# Patient Record
Sex: MALE | Race: WHITE | Employment: OTHER | URBAN - METROPOLITAN AREA
[De-identification: names, ages, dates, MRNs, and addresses within clinical notes are randomized per-mention and may not be internally consistent; named-entity substitution may affect disease eponyms.]

---

## 2017-04-28 PROCEDURE — 82043 UR ALBUMIN QUANTITATIVE: CPT | Performed by: INTERNAL MEDICINE

## 2017-04-28 PROCEDURE — 82570 ASSAY OF URINE CREATININE: CPT | Performed by: INTERNAL MEDICINE

## 2017-06-26 PROBLEM — E78.00 PURE HYPERCHOLESTEROLEMIA: Status: ACTIVE | Noted: 2017-06-26

## 2017-06-26 PROBLEM — I10 ESSENTIAL HYPERTENSION: Status: ACTIVE | Noted: 2017-06-26

## 2019-03-22 NOTE — LETTER
Patient Name: Micaela Guevara / Sex: 11/16/1960-A: 57 y  male   Medical Records: [id number] CSN: [id number]    DNAR NOTIFICATION    Your patient listed above has a procedure scheduled at [facility] and has indicated that he/she currently has a DNAR (Do Not Attempt Resuscitattion) with their Advanced Directives. Please note that you will be asked to address this matter with your patient pre-operatively. Thank you.       Procedure Date 8/14/2023  Procedure ESOPHAGOGASTRODUODENOSCOPY (EGD), N/A    Surgeon(s):  Jeremy Mensah DO Statement Selected

## 2023-02-06 ENCOUNTER — HOSPITAL ENCOUNTER (INPATIENT)
Facility: HOSPITAL | Age: 63
LOS: 3 days | Discharge: HOME | End: 2023-02-09
Attending: EMERGENCY MEDICINE | Admitting: INTERNAL MEDICINE
Payer: COMMERCIAL

## 2023-02-06 ENCOUNTER — APPOINTMENT (OUTPATIENT)
Dept: CT IMAGING | Facility: HOSPITAL | Age: 63
End: 2023-02-06
Attending: EMERGENCY MEDICINE
Payer: COMMERCIAL

## 2023-02-06 ENCOUNTER — APPOINTMENT (OUTPATIENT)
Dept: MRI IMAGING | Facility: HOSPITAL | Age: 63
End: 2023-02-06
Attending: EMERGENCY MEDICINE
Payer: COMMERCIAL

## 2023-02-06 DIAGNOSIS — E11.65 TYPE 2 DIABETES MELLITUS WITH HYPERGLYCEMIA, WITHOUT LONG-TERM CURRENT USE OF INSULIN (HCC): ICD-10-CM

## 2023-02-06 DIAGNOSIS — I63.9 ACUTE ISCHEMIC STROKE (HCC): Primary | ICD-10-CM

## 2023-02-06 PROBLEM — I10 BENIGN ESSENTIAL HTN: Status: ACTIVE | Noted: 2017-06-26

## 2023-02-06 LAB
ALBUMIN SERPL-MCNC: 3.9 G/DL (ref 3.4–5)
ALBUMIN/GLOB SERPL: 1 {RATIO} (ref 1–2)
ALP LIVER SERPL-CCNC: 162 U/L
ALT SERPL-CCNC: 53 U/L
ANION GAP SERPL CALC-SCNC: 6 MMOL/L (ref 0–18)
AST SERPL-CCNC: 36 U/L (ref 15–37)
BASOPHILS # BLD AUTO: 0.07 X10(3) UL (ref 0–0.2)
BASOPHILS NFR BLD AUTO: 1 %
BILIRUB SERPL-MCNC: 0.7 MG/DL (ref 0.1–2)
BUN BLD-MCNC: 17 MG/DL (ref 7–18)
CALCIUM BLD-MCNC: 9.6 MG/DL (ref 8.5–10.1)
CHLORIDE SERPL-SCNC: 102 MMOL/L (ref 98–112)
CHOLEST SERPL-MCNC: 210 MG/DL (ref ?–200)
CO2 SERPL-SCNC: 24 MMOL/L (ref 21–32)
CREAT BLD-MCNC: 1.22 MG/DL
EOSINOPHIL # BLD AUTO: 0.16 X10(3) UL (ref 0–0.7)
EOSINOPHIL NFR BLD AUTO: 2.4 %
ERYTHROCYTE [DISTWIDTH] IN BLOOD BY AUTOMATED COUNT: 12.6 %
EST. AVERAGE GLUCOSE BLD GHB EST-MCNC: 269 MG/DL (ref 68–126)
GFR SERPLBLD BASED ON 1.73 SQ M-ARVRAT: 66 ML/MIN/1.73M2 (ref 60–?)
GLOBULIN PLAS-MCNC: 4 G/DL (ref 2.8–4.4)
GLUCOSE BLD-MCNC: 242 MG/DL (ref 70–99)
GLUCOSE BLD-MCNC: 297 MG/DL (ref 70–99)
GLUCOSE BLD-MCNC: 356 MG/DL (ref 70–99)
GLUCOSE BLD-MCNC: 363 MG/DL (ref 70–99)
GLUCOSE BLD-MCNC: 413 MG/DL (ref 70–99)
HBA1C MFR BLD: 11 % (ref ?–5.7)
HCT VFR BLD AUTO: 53.9 %
HDLC SERPL-MCNC: 41 MG/DL (ref 40–59)
HGB BLD-MCNC: 18.2 G/DL
IMM GRANULOCYTES # BLD AUTO: 0.03 X10(3) UL (ref 0–1)
IMM GRANULOCYTES NFR BLD: 0.4 %
LDLC SERPL CALC-MCNC: 118 MG/DL (ref ?–100)
LYMPHOCYTES # BLD AUTO: 1.79 X10(3) UL (ref 1–4)
LYMPHOCYTES NFR BLD AUTO: 26.4 %
MCH RBC QN AUTO: 30.3 PG (ref 26–34)
MCHC RBC AUTO-ENTMCNC: 33.8 G/DL (ref 31–37)
MCV RBC AUTO: 89.8 FL
MONOCYTES # BLD AUTO: 0.34 X10(3) UL (ref 0.1–1)
MONOCYTES NFR BLD AUTO: 5 %
NEUTROPHILS # BLD AUTO: 4.39 X10 (3) UL (ref 1.5–7.7)
NEUTROPHILS # BLD AUTO: 4.39 X10(3) UL (ref 1.5–7.7)
NEUTROPHILS NFR BLD AUTO: 64.8 %
NONHDLC SERPL-MCNC: 169 MG/DL (ref ?–130)
OSMOLALITY SERPL CALC.SUM OF ELEC: 293 MOSM/KG (ref 275–295)
PLATELET # BLD AUTO: 202 10(3)UL (ref 150–450)
POTASSIUM SERPL-SCNC: 3.6 MMOL/L (ref 3.5–5.1)
PROT SERPL-MCNC: 7.9 G/DL (ref 6.4–8.2)
RBC # BLD AUTO: 6 X10(6)UL
SARS-COV-2 RNA RESP QL NAA+PROBE: NOT DETECTED
SODIUM SERPL-SCNC: 132 MMOL/L (ref 136–145)
TRIGL SERPL-MCNC: 292 MG/DL (ref 30–149)
TROPONIN I HIGH SENSITIVITY: 15 NG/L
VLDLC SERPL CALC-MCNC: 52 MG/DL (ref 0–30)
WBC # BLD AUTO: 6.8 X10(3) UL (ref 4–11)

## 2023-02-06 PROCEDURE — 70546 MR ANGIOGRAPH HEAD W/O&W/DYE: CPT | Performed by: EMERGENCY MEDICINE

## 2023-02-06 PROCEDURE — 70450 CT HEAD/BRAIN W/O DYE: CPT | Performed by: EMERGENCY MEDICINE

## 2023-02-06 PROCEDURE — 70553 MRI BRAIN STEM W/O & W/DYE: CPT | Performed by: EMERGENCY MEDICINE

## 2023-02-06 PROCEDURE — 70549 MR ANGIOGRAPH NECK W/O&W/DYE: CPT | Performed by: EMERGENCY MEDICINE

## 2023-02-06 PROCEDURE — 99223 1ST HOSP IP/OBS HIGH 75: CPT | Performed by: HOSPITALIST

## 2023-02-06 RX ORDER — PROCHLORPERAZINE EDISYLATE 5 MG/ML
5 INJECTION INTRAMUSCULAR; INTRAVENOUS EVERY 8 HOURS PRN
Status: DISCONTINUED | OUTPATIENT
Start: 2023-02-06 | End: 2023-02-09

## 2023-02-06 RX ORDER — GADOTERATE MEGLUMINE 376.9 MG/ML
20 INJECTION INTRAVENOUS
Status: COMPLETED | OUTPATIENT
Start: 2023-02-06 | End: 2023-02-06

## 2023-02-06 RX ORDER — SODIUM CHLORIDE 9 MG/ML
INJECTION, SOLUTION INTRAVENOUS CONTINUOUS
Status: ACTIVE | OUTPATIENT
Start: 2023-02-06 | End: 2023-02-08

## 2023-02-06 RX ORDER — ONDANSETRON 2 MG/ML
4 INJECTION INTRAMUSCULAR; INTRAVENOUS EVERY 6 HOURS PRN
Status: DISCONTINUED | OUTPATIENT
Start: 2023-02-06 | End: 2023-02-09

## 2023-02-06 RX ORDER — DEXTROSE MONOHYDRATE 25 G/50ML
50 INJECTION, SOLUTION INTRAVENOUS
Status: DISCONTINUED | OUTPATIENT
Start: 2023-02-06 | End: 2023-02-09

## 2023-02-06 RX ORDER — INSULIN ASPART 100 [IU]/ML
5 INJECTION, SOLUTION INTRAVENOUS; SUBCUTANEOUS ONCE
Status: COMPLETED | OUTPATIENT
Start: 2023-02-06 | End: 2023-02-06

## 2023-02-06 RX ORDER — ASPIRIN 81 MG/1
324 TABLET, CHEWABLE ORAL ONCE
Status: DISCONTINUED | OUTPATIENT
Start: 2023-02-06 | End: 2023-02-06

## 2023-02-06 RX ORDER — HYDRALAZINE HYDROCHLORIDE 20 MG/ML
10 INJECTION INTRAMUSCULAR; INTRAVENOUS EVERY 2 HOUR PRN
Status: DISCONTINUED | OUTPATIENT
Start: 2023-02-06 | End: 2023-02-09

## 2023-02-06 RX ORDER — ACETAMINOPHEN 325 MG/1
650 TABLET ORAL EVERY 4 HOURS PRN
Status: DISCONTINUED | OUTPATIENT
Start: 2023-02-06 | End: 2023-02-09

## 2023-02-06 RX ORDER — LABETALOL HYDROCHLORIDE 5 MG/ML
10 INJECTION, SOLUTION INTRAVENOUS ONCE
Status: COMPLETED | OUTPATIENT
Start: 2023-02-06 | End: 2023-02-06

## 2023-02-06 RX ORDER — ASPIRIN 300 MG/1
300 SUPPOSITORY RECTAL ONCE
Status: COMPLETED | OUTPATIENT
Start: 2023-02-06 | End: 2023-02-06

## 2023-02-06 RX ORDER — ASPIRIN 325 MG
325 TABLET ORAL DAILY
Status: DISCONTINUED | OUTPATIENT
Start: 2023-02-07 | End: 2023-02-09

## 2023-02-06 RX ORDER — HYDROCODONE BITARTRATE AND ACETAMINOPHEN 5; 325 MG/1; MG/1
1 TABLET ORAL EVERY 6 HOURS PRN
Status: DISCONTINUED | OUTPATIENT
Start: 2023-02-06 | End: 2023-02-09

## 2023-02-06 RX ORDER — ENOXAPARIN SODIUM 100 MG/ML
40 INJECTION SUBCUTANEOUS DAILY
Status: DISCONTINUED | OUTPATIENT
Start: 2023-02-06 | End: 2023-02-09

## 2023-02-06 RX ORDER — LABETALOL HYDROCHLORIDE 5 MG/ML
10 INJECTION, SOLUTION INTRAVENOUS EVERY 10 MIN PRN
Status: DISCONTINUED | OUTPATIENT
Start: 2023-02-06 | End: 2023-02-09

## 2023-02-06 RX ORDER — NICOTINE POLACRILEX 4 MG
15 LOZENGE BUCCAL
Status: DISCONTINUED | OUTPATIENT
Start: 2023-02-06 | End: 2023-02-09

## 2023-02-06 RX ORDER — ACETAMINOPHEN 500 MG
500 TABLET ORAL EVERY 4 HOURS PRN
Status: DISCONTINUED | OUTPATIENT
Start: 2023-02-06 | End: 2023-02-09

## 2023-02-06 RX ORDER — ASPIRIN 300 MG/1
300 SUPPOSITORY RECTAL DAILY
Status: DISCONTINUED | OUTPATIENT
Start: 2023-02-07 | End: 2023-02-09

## 2023-02-06 RX ORDER — ACETAMINOPHEN 650 MG/1
650 SUPPOSITORY RECTAL EVERY 4 HOURS PRN
Status: DISCONTINUED | OUTPATIENT
Start: 2023-02-06 | End: 2023-02-09

## 2023-02-06 RX ORDER — ATORVASTATIN CALCIUM 40 MG/1
40 TABLET, FILM COATED ORAL NIGHTLY
Status: DISCONTINUED | OUTPATIENT
Start: 2023-02-06 | End: 2023-02-07

## 2023-02-06 RX ORDER — NICOTINE POLACRILEX 4 MG
30 LOZENGE BUCCAL
Status: DISCONTINUED | OUTPATIENT
Start: 2023-02-06 | End: 2023-02-09

## 2023-02-06 NOTE — ED QUICK NOTES
Orders for admission, patient is aware of plan and ready to go upstairs. Any questions, please call ED RN Estella Lowery at extension [phone].      Patient Covid vaccination status: Unvaccinated     COVID Test Ordered in ED: Rapid SARS-CoV-2 by PCR    COVID Suspicion at Admission: Low clinical suspicion for COVID    Running Infusions:      Mental Status/LOC at time of transport: A&Ox4    Other pertinent information:   CIWA score: N/A   NIH score:  0

## 2023-02-06 NOTE — ED INITIAL ASSESSMENT (HPI)
Pt daughter noticed her father's speech was slurring this morning beginning about 0930 this am. Pt states he has left leg pain. Pt is a diabetic but does not comply with medications.

## 2023-02-06 NOTE — ED QUICK NOTES
Spoke with pt regarding his admission, his hyperglycemia, hypertension, and his upcoming MRI. The pt understands for the most part, pt wife verbalizes her understanding.

## 2023-02-07 ENCOUNTER — APPOINTMENT (OUTPATIENT)
Dept: CV DIAGNOSTICS | Facility: HOSPITAL | Age: 63
End: 2023-02-07
Attending: HOSPITALIST
Payer: COMMERCIAL

## 2023-02-07 LAB
ATRIAL RATE: 126 BPM
GLUCOSE BLD-MCNC: 184 MG/DL (ref 70–99)
GLUCOSE BLD-MCNC: 190 MG/DL (ref 70–99)
GLUCOSE BLD-MCNC: 222 MG/DL (ref 70–99)
GLUCOSE BLD-MCNC: 259 MG/DL (ref 70–99)
P AXIS: 41 DEGREES
P-R INTERVAL: 140 MS
POTASSIUM SERPL-SCNC: 3.9 MMOL/L (ref 3.5–5.1)
Q-T INTERVAL: 338 MS
QRS DURATION: 110 MS
QTC CALCULATION (BEZET): 489 MS
R AXIS: -58 DEGREES
T AXIS: 18 DEGREES
VENTRICULAR RATE: 126 BPM

## 2023-02-07 PROCEDURE — 93306 TTE W/DOPPLER COMPLETE: CPT | Performed by: HOSPITALIST

## 2023-02-07 PROCEDURE — 99221 1ST HOSP IP/OBS SF/LOW 40: CPT | Performed by: INTERNAL MEDICINE

## 2023-02-07 PROCEDURE — 99232 SBSQ HOSP IP/OBS MODERATE 35: CPT | Performed by: HOSPITALIST

## 2023-02-07 RX ORDER — ASPIRIN 325 MG
325 TABLET ORAL DAILY
Qty: 30 TABLET | Refills: 2 | Status: ON HOLD | OUTPATIENT
Start: 2023-02-08 | End: 2023-02-16

## 2023-02-07 RX ORDER — POTASSIUM CHLORIDE 20 MEQ/1
40 TABLET, EXTENDED RELEASE ORAL EVERY 4 HOURS
Status: COMPLETED | OUTPATIENT
Start: 2023-02-07 | End: 2023-02-07

## 2023-02-07 RX ORDER — AMLODIPINE BESYLATE 5 MG/1
5 TABLET ORAL DAILY
Status: DISCONTINUED | OUTPATIENT
Start: 2023-02-07 | End: 2023-02-08

## 2023-02-07 RX ORDER — LOSARTAN POTASSIUM 25 MG/1
25 TABLET ORAL DAILY
Status: DISCONTINUED | OUTPATIENT
Start: 2023-02-07 | End: 2023-02-08

## 2023-02-07 RX ORDER — ATORVASTATIN CALCIUM 80 MG/1
80 TABLET, FILM COATED ORAL NIGHTLY
Qty: 30 TABLET | Refills: 2 | Status: ON HOLD | OUTPATIENT
Start: 2023-02-07

## 2023-02-07 RX ORDER — ATORVASTATIN CALCIUM 80 MG/1
80 TABLET, FILM COATED ORAL NIGHTLY
Status: DISCONTINUED | OUTPATIENT
Start: 2023-02-07 | End: 2023-02-09

## 2023-02-07 NOTE — DISCHARGE INSTRUCTIONS
[facility]  For more information on services offered at our [facility] location, please call [phone]     Psychiatry- Dr. Rosalina Adamson would like you to have referrals for you to follow up with a psychotherapist and psychiatrist:    Psychiatrist/ Nurse Practitioner    [address] both psychiatrist and psychotherapist  [phone]  [address] [place], [address] [facility] [place] [facility]  [address]   Psychiatric Mental Health Nurse Practitioner ([address])   [place], [place], [address]   [phone] Extension: [address] [facility]   Nurse Practitioner ([address])   [address] [facility], [address], [facility], [address]   [phone] Extension:        Burlene Spring   Nurse Practitioner [facility] in Psych and Counseling)   [address] [facility] [address] [facility], [facility], [address]   [phone] Extension: [phone]      For Psychotherapy:       Sarah Osuna   Counselor [facility] in [place] and Counseling)   [facility] [facility], [place], [place], [address]  [phone]     Sydney Willis   Therapist (Counseling Works)   [address] [facility], [place], [place], [address]   [phone] Extension: [address]   Therapist [facility] Counselors)   [address], [place], [place], [address]                                      [phone] Extension: [phone]       Olman Jean   Therapist [facility])   [address] [facility], [place], [address]   [phone] Extension: [phone]

## 2023-02-07 NOTE — CM/SW NOTE
PT rec is Day Rehab. Referral sent to [facility]. MSW met with pt and his family at bedside, they would like referral to Day rehab.

## 2023-02-07 NOTE — PLAN OF CARE
PT A/O, 94% ON RA, BP WITHIN PARAMETERS, NEUROS WERE DONE Q 2 HOURS, NOW Q 4 HOURS, NO NEURO DEFICITS, IVF INFUSING, SLEEPING QUIETLY MOST OF NIGHT, INSTRUCTED PT ON POC, ECHO IN AM    Problem: NEUROLOGICAL - ADULT  Goal: Achieves stable or improved neurological status  Description: INTERVENTIONS  - Assess for and report changes in neurological status  - Initiate measures to prevent increased intracranial pressure  - Maintain blood pressure and fluid volume within ordered parameters to optimize cerebral perfusion and minimize risk of hemorrhage  - Monitor temperature, glucose, and sodium.  Initiate appropriate interventions as ordered  Outcome: Progressing

## 2023-02-07 NOTE — PROGRESS NOTES
MRI result called in to Neurologist Dr. Wendy Lacey. Okay to keep SBP<220 .  Further neuro orders will be placed by Dr. Reba Casillas

## 2023-02-08 ENCOUNTER — TELEPHONE (OUTPATIENT)
Dept: CASE MANAGEMENT | Facility: HOSPITAL | Age: 63
End: 2023-02-08

## 2023-02-08 LAB
BASOPHILS # BLD AUTO: 0.07 X10(3) UL (ref 0–0.2)
BASOPHILS NFR BLD AUTO: 0.8 %
EOSINOPHIL # BLD AUTO: 0.47 X10(3) UL (ref 0–0.7)
EOSINOPHIL NFR BLD AUTO: 5.2 %
ERYTHROCYTE [DISTWIDTH] IN BLOOD BY AUTOMATED COUNT: 12.4 %
GLUCOSE BLD-MCNC: 179 MG/DL (ref 70–99)
GLUCOSE BLD-MCNC: 195 MG/DL (ref 70–99)
GLUCOSE BLD-MCNC: 198 MG/DL (ref 70–99)
GLUCOSE BLD-MCNC: 254 MG/DL (ref 70–99)
HCT VFR BLD AUTO: 51.1 %
HGB BLD-MCNC: 16.5 G/DL
IMM GRANULOCYTES # BLD AUTO: 0.05 X10(3) UL (ref 0–1)
IMM GRANULOCYTES NFR BLD: 0.6 %
LYMPHOCYTES # BLD AUTO: 2.12 X10(3) UL (ref 1–4)
LYMPHOCYTES NFR BLD AUTO: 23.6 %
MCH RBC QN AUTO: 30.2 PG (ref 26–34)
MCHC RBC AUTO-ENTMCNC: 32.3 G/DL (ref 31–37)
MCV RBC AUTO: 93.4 FL
MONOCYTES # BLD AUTO: 0.71 X10(3) UL (ref 0.1–1)
MONOCYTES NFR BLD AUTO: 7.9 %
NEUTROPHILS # BLD AUTO: 5.56 X10 (3) UL (ref 1.5–7.7)
NEUTROPHILS # BLD AUTO: 5.56 X10(3) UL (ref 1.5–7.7)
NEUTROPHILS NFR BLD AUTO: 61.9 %
PLATELET # BLD AUTO: 170 10(3)UL (ref 150–450)
RBC # BLD AUTO: 5.47 X10(6)UL
WBC # BLD AUTO: 9 X10(3) UL (ref 4–11)

## 2023-02-08 PROCEDURE — 99232 SBSQ HOSP IP/OBS MODERATE 35: CPT | Performed by: HOSPITALIST

## 2023-02-08 RX ORDER — ESCITALOPRAM OXALATE 5 MG/1
5 TABLET ORAL DAILY
Status: DISCONTINUED | OUTPATIENT
Start: 2023-02-08 | End: 2023-02-09

## 2023-02-08 RX ORDER — LOSARTAN POTASSIUM 100 MG/1
100 TABLET ORAL DAILY
Status: DISCONTINUED | OUTPATIENT
Start: 2023-02-09 | End: 2023-02-09

## 2023-02-08 RX ORDER — INSULIN GLARGINE-YFGN 100 [IU]/ML
9 INJECTION, SOLUTION SUBCUTANEOUS NIGHTLY
Qty: 15 ML | Refills: 3 | Status: SHIPPED | OUTPATIENT
Start: 2023-02-08 | End: 2023-02-09

## 2023-02-08 RX ORDER — LOSARTAN POTASSIUM 25 MG/1
25 TABLET ORAL ONCE
Status: COMPLETED | OUTPATIENT
Start: 2023-02-08 | End: 2023-02-08

## 2023-02-08 RX ORDER — AMLODIPINE BESYLATE 10 MG/1
10 TABLET ORAL DAILY
Qty: 30 TABLET | Refills: 0 | Status: ON HOLD | OUTPATIENT
Start: 2023-02-09

## 2023-02-08 RX ORDER — ESCITALOPRAM OXALATE 5 MG/1
5 TABLET ORAL DAILY
Qty: 30 TABLET | Refills: 0 | Status: ON HOLD | OUTPATIENT
Start: 2023-02-08

## 2023-02-08 RX ORDER — PEN NEEDLE, DIABETIC 32GX 5/32"
NEEDLE, DISPOSABLE MISCELLANEOUS
Qty: 100 EACH | Refills: 6 | Status: SHIPPED | OUTPATIENT
Start: 2023-02-08 | End: 2023-02-10

## 2023-02-08 RX ORDER — BLOOD SUGAR DIAGNOSTIC
STRIP MISCELLANEOUS
Qty: 50 STRIP | Refills: 6 | Status: SHIPPED | OUTPATIENT
Start: 2023-02-08 | End: 2023-02-10

## 2023-02-08 RX ORDER — AMLODIPINE BESYLATE 5 MG/1
10 TABLET ORAL DAILY
Status: DISCONTINUED | OUTPATIENT
Start: 2023-02-09 | End: 2023-02-09

## 2023-02-08 RX ORDER — AMLODIPINE BESYLATE 5 MG/1
5 TABLET ORAL ONCE
Status: COMPLETED | OUTPATIENT
Start: 2023-02-08 | End: 2023-02-08

## 2023-02-08 RX ORDER — BLOOD-GLUCOSE METER
EACH MISCELLANEOUS
Qty: 1 KIT | Refills: 0 | Status: SHIPPED | OUTPATIENT
Start: 2023-02-08 | End: 2023-02-10

## 2023-02-08 RX ORDER — INSULIN LISPRO 200 [IU]/ML
INJECTION, SOLUTION SUBCUTANEOUS
Qty: 5 EACH | Refills: 3 | Status: ON HOLD | OUTPATIENT
Start: 2023-02-08

## 2023-02-08 RX ORDER — LOSARTAN POTASSIUM 50 MG/1
50 TABLET ORAL DAILY
Qty: 30 TABLET | Refills: 0 | Status: SHIPPED | OUTPATIENT
Start: 2023-02-09 | End: 2023-02-09

## 2023-02-08 RX ORDER — LOSARTAN POTASSIUM 50 MG/1
50 TABLET ORAL DAILY
Status: DISCONTINUED | OUTPATIENT
Start: 2023-02-09 | End: 2023-02-08

## 2023-02-08 NOTE — PLAN OF CARE
Assumed patient care at [address] [place] [address]. Patient is A&Ox4. Neuro checks Q4H. NIH Daily. Neurology-following. Vital signs stable. Afebrile. Room air. SpO2 above 94%. NSR on tele. Hypertensive, PRN Hydralazine if SBP >180. Carb Controlled diet. Poor appetite. Accu checks QID, insulin given per MAR. Continent. Up independently. Right FA IV infusing 0.9%NS at 75 ml/hr. See MAR/Flowsheets for further information. All needs being met at this time. Prblem: Diabetes/Glucose Control  Goal: Glucose maintained within prescribed range  Description: INTERVENTIONS:  - Monitor Blood Glucose as ordered  - Assess for signs and symptoms of hyperglycemia and hypoglycemia  - Administer ordered medications to maintain glucose within target range  - Assess barriers to adequate nutritional intake and initiate nutrition consult as needed  - Instruct patient on self management of diabetes  Outcome: Progressing     Problem: Patient/Family Goals  Goal: Patient/Family Long Term Goal  Description: Patient's Long Term Goal: Discharge    Interventions:  - MD Consults  - Stroke protocol  - IV Fluids  - Give medications as ordered  - See additional Care Plan goals for specific interventions  Outcome: Progressing  Goal: Patient/Family Short Term Goal  Description: Patient's Short Term Goal: Continue stroke protocol. Interventions:   - Neuro checks and NIH   - Tele monitor/  - Accuchecks   - IV fluids  - PT/OT  - See additional Care Plan goals for specific interventions  Outcome: Progressing     Problem: NEUROLOGICAL - ADULT  Goal: Achieves stable or improved neurological status  Description: INTERVENTIONS  - Assess for and report changes in neurological status  - Initiate measures to prevent increased intracranial pressure  - Maintain blood pressure and fluid volume within ordered parameters to optimize cerebral perfusion and minimize risk of hemorrhage  - Monitor temperature, glucose, and sodium.  Initiate appropriate interventions as ordered  Outcome: Progressing

## 2023-02-08 NOTE — PROGRESS NOTES
Neuro checks Q4H. Patient able to ambulate without assistance. Accuchecks, blood sugars elevated. Patient has poor appetite. Diabetes education reviewed including importance of dietary choices and exercise. IV fluids infusing. BP elevated. Prn if >180. Wife at bedside. Continue to monitor.

## 2023-02-08 NOTE — CM/SW NOTE
Yaya RN case manager with [employer] calling to offer discharge planning assisatcne. If needed please call back at [phone].

## 2023-02-08 NOTE — BH PROGRESS NOTE
Referrals placed in the discharge summary for the patient to follow up with a psychotherapist and psychiatrist per Dr. Shlomo Clark.

## 2023-02-09 VITALS
HEIGHT: 67 IN | SYSTOLIC BLOOD PRESSURE: 130 MMHG | HEART RATE: 95 BPM | DIASTOLIC BLOOD PRESSURE: 74 MMHG | TEMPERATURE: 98 F | WEIGHT: 199 LBS | OXYGEN SATURATION: 96 % | RESPIRATION RATE: 18 BRPM | BODY MASS INDEX: 31.23 KG/M2

## 2023-02-09 LAB
CREAT BLD-MCNC: 0.83 MG/DL
GFR SERPLBLD BASED ON 1.73 SQ M-ARVRAT: 98 ML/MIN/1.73M2 (ref 60–?)
GLUCOSE BLD-MCNC: 156 MG/DL (ref 70–99)
GLUCOSE BLD-MCNC: 180 MG/DL (ref 70–99)

## 2023-02-09 PROCEDURE — 99239 HOSP IP/OBS DSCHRG MGMT >30: CPT | Performed by: HOSPITALIST

## 2023-02-09 RX ORDER — LOSARTAN POTASSIUM 100 MG/1
100 TABLET ORAL DAILY
Qty: 30 TABLET | Refills: 0 | Status: ON HOLD | OUTPATIENT
Start: 2023-02-10

## 2023-02-09 RX ORDER — INSULIN GLARGINE-YFGN 100 [IU]/ML
10 INJECTION, SOLUTION SUBCUTANEOUS NIGHTLY
Qty: 30 ML | Refills: 3 | Status: ON HOLD | OUTPATIENT
Start: 2023-02-09

## 2023-02-09 RX ORDER — METOPROLOL TARTRATE 50 MG/1
50 TABLET, FILM COATED ORAL
Qty: 60 TABLET | Refills: 0 | Status: ON HOLD | OUTPATIENT
Start: 2023-02-09

## 2023-02-09 NOTE — PROGRESS NOTES
Pt's /102 one dose labetetol was given, second check 162/197. Patient's BP was not maintained under 160, so he was not discharged tonight. Education and demonstration provided to patient, his wife, and his son regarding administering insulin. Reviewed materials with family.

## 2023-02-09 NOTE — PROGRESS NOTES
Pt A&Ox4. Impulsive. Qshift neuro checks. L sided deficit. Per order, to keep SBP <180. On tele running NSR.  RA.  0.9% NaCl @ 75. Antidepressant started. Carb controlled diet. Accuchecks. Unsteady. Uses walker. Plan to have day rehab outpatient.

## 2023-02-10 ENCOUNTER — APPOINTMENT (OUTPATIENT)
Dept: GENERAL RADIOLOGY | Facility: HOSPITAL | Age: 63
DRG: 064 | End: 2023-02-10
Attending: HOSPITALIST
Payer: COMMERCIAL

## 2023-02-10 ENCOUNTER — PATIENT OUTREACH (OUTPATIENT)
Dept: CASE MANAGEMENT | Age: 63
End: 2023-02-10

## 2023-02-10 ENCOUNTER — HOSPITAL ENCOUNTER (INPATIENT)
Facility: HOSPITAL | Age: 63
LOS: 11 days | Discharge: NURSING, REHAB OR LONG-TERM CARE | End: 2023-02-24
Attending: EMERGENCY MEDICINE | Admitting: HOSPITALIST
Payer: COMMERCIAL

## 2023-02-10 ENCOUNTER — APPOINTMENT (OUTPATIENT)
Dept: CT IMAGING | Facility: HOSPITAL | Age: 63
DRG: 064 | End: 2023-02-10
Attending: EMERGENCY MEDICINE
Payer: COMMERCIAL

## 2023-02-10 ENCOUNTER — APPOINTMENT (OUTPATIENT)
Dept: ULTRASOUND IMAGING | Facility: HOSPITAL | Age: 63
End: 2023-02-10
Attending: EMERGENCY MEDICINE
Payer: COMMERCIAL

## 2023-02-10 ENCOUNTER — APPOINTMENT (OUTPATIENT)
Dept: GENERAL RADIOLOGY | Facility: HOSPITAL | Age: 63
End: 2023-02-10
Attending: EMERGENCY MEDICINE
Payer: COMMERCIAL

## 2023-02-10 ENCOUNTER — APPOINTMENT (OUTPATIENT)
Dept: GENERAL RADIOLOGY | Facility: HOSPITAL | Age: 63
DRG: 064 | End: 2023-02-10
Attending: EMERGENCY MEDICINE
Payer: COMMERCIAL

## 2023-02-10 ENCOUNTER — HOSPITAL ENCOUNTER (INPATIENT)
Facility: HOSPITAL | Age: 63
LOS: 11 days | Discharge: NURSING, REHAB OR LONG-TERM CARE | DRG: 064 | End: 2023-02-24
Attending: EMERGENCY MEDICINE | Admitting: HOSPITALIST
Payer: COMMERCIAL

## 2023-02-10 ENCOUNTER — APPOINTMENT (OUTPATIENT)
Dept: ULTRASOUND IMAGING | Facility: HOSPITAL | Age: 63
DRG: 064 | End: 2023-02-10
Attending: EMERGENCY MEDICINE
Payer: COMMERCIAL

## 2023-02-10 ENCOUNTER — APPOINTMENT (OUTPATIENT)
Dept: MRI IMAGING | Facility: HOSPITAL | Age: 63
DRG: 064 | End: 2023-02-10
Attending: Other
Payer: COMMERCIAL

## 2023-02-10 ENCOUNTER — APPOINTMENT (OUTPATIENT)
Dept: CT IMAGING | Facility: HOSPITAL | Age: 63
End: 2023-02-10
Attending: EMERGENCY MEDICINE
Payer: COMMERCIAL

## 2023-02-10 ENCOUNTER — APPOINTMENT (OUTPATIENT)
Dept: GENERAL RADIOLOGY | Facility: HOSPITAL | Age: 63
End: 2023-02-10
Attending: HOSPITALIST
Payer: COMMERCIAL

## 2023-02-10 ENCOUNTER — APPOINTMENT (OUTPATIENT)
Dept: MRI IMAGING | Facility: HOSPITAL | Age: 63
End: 2023-02-10
Attending: Other
Payer: COMMERCIAL

## 2023-02-10 DIAGNOSIS — I10 UNCONTROLLED HYPERTENSION: Primary | ICD-10-CM

## 2023-02-10 DIAGNOSIS — R53.1 WEAKNESS GENERALIZED: ICD-10-CM

## 2023-02-10 LAB
ALBUMIN SERPL-MCNC: 3.6 G/DL (ref 3.4–5)
ALBUMIN/GLOB SERPL: 1 {RATIO} (ref 1–2)
ALP LIVER SERPL-CCNC: 107 U/L
ALT SERPL-CCNC: 41 U/L
ANION GAP SERPL CALC-SCNC: 7 MMOL/L (ref 0–18)
AST SERPL-CCNC: 40 U/L (ref 15–37)
BASOPHILS # BLD AUTO: 0.06 X10(3) UL (ref 0–0.2)
BASOPHILS NFR BLD AUTO: 0.4 %
BILIRUB SERPL-MCNC: 1.3 MG/DL (ref 0.1–2)
BILIRUB UR QL STRIP.AUTO: NEGATIVE
BUN BLD-MCNC: 19 MG/DL (ref 7–18)
CALCIUM BLD-MCNC: 9.4 MG/DL (ref 8.5–10.1)
CHLORIDE SERPL-SCNC: 104 MMOL/L (ref 98–112)
CO2 SERPL-SCNC: 26 MMOL/L (ref 21–32)
COLOR UR AUTO: YELLOW
CREAT BLD-MCNC: 1.05 MG/DL
EOSINOPHIL # BLD AUTO: 0.35 X10(3) UL (ref 0–0.7)
EOSINOPHIL NFR BLD AUTO: 2.5 %
ERYTHROCYTE [DISTWIDTH] IN BLOOD BY AUTOMATED COUNT: 12.2 %
GFR SERPLBLD BASED ON 1.73 SQ M-ARVRAT: 79 ML/MIN/1.73M2 (ref 60–?)
GLOBULIN PLAS-MCNC: 3.7 G/DL (ref 2.8–4.4)
GLUCOSE BLD-MCNC: 148 MG/DL (ref 70–99)
GLUCOSE BLD-MCNC: 157 MG/DL (ref 70–99)
GLUCOSE BLD-MCNC: 179 MG/DL (ref 70–99)
GLUCOSE UR STRIP.AUTO-MCNC: 50 MG/DL
HCT VFR BLD AUTO: 53.5 %
HGB BLD-MCNC: 18.3 G/DL
HYALINE CASTS #/AREA URNS AUTO: PRESENT /LPF
IMM GRANULOCYTES # BLD AUTO: 0.04 X10(3) UL (ref 0–1)
IMM GRANULOCYTES NFR BLD: 0.3 %
KETONES UR STRIP.AUTO-MCNC: 20 MG/DL
LEUKOCYTE ESTERASE UR QL STRIP.AUTO: NEGATIVE
LYMPHOCYTES # BLD AUTO: 2.01 X10(3) UL (ref 1–4)
LYMPHOCYTES NFR BLD AUTO: 14.4 %
MCH RBC QN AUTO: 31 PG (ref 26–34)
MCHC RBC AUTO-ENTMCNC: 34.2 G/DL (ref 31–37)
MCV RBC AUTO: 90.7 FL
MONOCYTES # BLD AUTO: 0.88 X10(3) UL (ref 0.1–1)
MONOCYTES NFR BLD AUTO: 6.3 %
NEUTROPHILS # BLD AUTO: 10.63 X10 (3) UL (ref 1.5–7.7)
NEUTROPHILS # BLD AUTO: 10.63 X10(3) UL (ref 1.5–7.7)
NEUTROPHILS NFR BLD AUTO: 76.1 %
NITRITE UR QL STRIP.AUTO: NEGATIVE
OSMOLALITY SERPL CALC.SUM OF ELEC: 291 MOSM/KG (ref 275–295)
PH UR STRIP.AUTO: 5 [PH] (ref 5–8)
PLATELET # BLD AUTO: 227 10(3)UL (ref 150–450)
POTASSIUM SERPL-SCNC: 3.7 MMOL/L (ref 3.5–5.1)
PROT SERPL-MCNC: 7.3 G/DL (ref 6.4–8.2)
PROT UR STRIP.AUTO-MCNC: 30 MG/DL
RBC # BLD AUTO: 5.9 X10(6)UL
SARS-COV-2 RNA RESP QL NAA+PROBE: NOT DETECTED
SODIUM SERPL-SCNC: 137 MMOL/L (ref 136–145)
SP GR UR STRIP.AUTO: 1.02 (ref 1–1.03)
UROBILINOGEN UR STRIP.AUTO-MCNC: <2 MG/DL
WBC # BLD AUTO: 14 X10(3) UL (ref 4–11)

## 2023-02-10 PROCEDURE — 71045 X-RAY EXAM CHEST 1 VIEW: CPT | Performed by: EMERGENCY MEDICINE

## 2023-02-10 PROCEDURE — 99223 1ST HOSP IP/OBS HIGH 75: CPT | Performed by: HOSPITALIST

## 2023-02-10 PROCEDURE — 70450 CT HEAD/BRAIN W/O DYE: CPT | Performed by: EMERGENCY MEDICINE

## 2023-02-10 PROCEDURE — 70551 MRI BRAIN STEM W/O DYE: CPT | Performed by: OTHER

## 2023-02-10 PROCEDURE — 99255 IP/OBS CONSLTJ NEW/EST HI 80: CPT | Performed by: OTHER

## 2023-02-10 PROCEDURE — 93971 EXTREMITY STUDY: CPT | Performed by: EMERGENCY MEDICINE

## 2023-02-10 PROCEDURE — 73552 X-RAY EXAM OF FEMUR 2/>: CPT | Performed by: HOSPITALIST

## 2023-02-10 PROCEDURE — 73502 X-RAY EXAM HIP UNI 2-3 VIEWS: CPT | Performed by: HOSPITALIST

## 2023-02-10 RX ORDER — MORPHINE SULFATE 2 MG/ML
2 INJECTION, SOLUTION INTRAMUSCULAR; INTRAVENOUS ONCE
Status: COMPLETED | OUTPATIENT
Start: 2023-02-10 | End: 2023-02-10

## 2023-02-10 RX ORDER — AMLODIPINE BESYLATE 5 MG/1
10 TABLET ORAL DAILY
Status: DISCONTINUED | OUTPATIENT
Start: 2023-02-10 | End: 2023-02-10

## 2023-02-10 RX ORDER — LORAZEPAM 2 MG/ML
1 INJECTION INTRAMUSCULAR ONCE
Status: DISCONTINUED | OUTPATIENT
Start: 2023-02-10 | End: 2023-02-15

## 2023-02-10 RX ORDER — ASPIRIN 325 MG
325 TABLET ORAL DAILY
Status: DISCONTINUED | OUTPATIENT
Start: 2023-02-11 | End: 2023-02-12

## 2023-02-10 RX ORDER — HYDRALAZINE HYDROCHLORIDE 20 MG/ML
10 INJECTION INTRAMUSCULAR; INTRAVENOUS ONCE
Status: COMPLETED | OUTPATIENT
Start: 2023-02-10 | End: 2023-02-10

## 2023-02-10 RX ORDER — DEXTROSE MONOHYDRATE 25 G/50ML
50 INJECTION, SOLUTION INTRAVENOUS
Status: DISCONTINUED | OUTPATIENT
Start: 2023-02-10 | End: 2023-02-24

## 2023-02-10 RX ORDER — NICOTINE POLACRILEX 4 MG
30 LOZENGE BUCCAL
Status: DISCONTINUED | OUTPATIENT
Start: 2023-02-10 | End: 2023-02-24

## 2023-02-10 RX ORDER — MORPHINE SULFATE 2 MG/ML
1 INJECTION, SOLUTION INTRAMUSCULAR; INTRAVENOUS EVERY 2 HOUR PRN
Status: DISCONTINUED | OUTPATIENT
Start: 2023-02-10 | End: 2023-02-16

## 2023-02-10 RX ORDER — SODIUM CHLORIDE 9 MG/ML
INJECTION, SOLUTION INTRAVENOUS CONTINUOUS
Status: ACTIVE | OUTPATIENT
Start: 2023-02-10 | End: 2023-02-11

## 2023-02-10 RX ORDER — MORPHINE SULFATE 4 MG/ML
4 INJECTION, SOLUTION INTRAMUSCULAR; INTRAVENOUS EVERY 2 HOUR PRN
Status: DISCONTINUED | OUTPATIENT
Start: 2023-02-10 | End: 2023-02-16

## 2023-02-10 RX ORDER — MELATONIN
3 NIGHTLY PRN
Status: DISCONTINUED | OUTPATIENT
Start: 2023-02-10 | End: 2023-02-24

## 2023-02-10 RX ORDER — ACETAMINOPHEN 500 MG
500 TABLET ORAL EVERY 4 HOURS PRN
Status: DISCONTINUED | OUTPATIENT
Start: 2023-02-10 | End: 2023-02-24

## 2023-02-10 RX ORDER — ATORVASTATIN CALCIUM 80 MG/1
80 TABLET, FILM COATED ORAL NIGHTLY
Status: DISCONTINUED | OUTPATIENT
Start: 2023-02-10 | End: 2023-02-24

## 2023-02-10 RX ORDER — NICOTINE POLACRILEX 4 MG
15 LOZENGE BUCCAL
Status: DISCONTINUED | OUTPATIENT
Start: 2023-02-10 | End: 2023-02-24

## 2023-02-10 RX ORDER — ESCITALOPRAM OXALATE 5 MG/1
5 TABLET ORAL DAILY
Status: DISCONTINUED | OUTPATIENT
Start: 2023-02-11 | End: 2023-02-24

## 2023-02-10 RX ORDER — LOSARTAN POTASSIUM 100 MG/1
100 TABLET ORAL DAILY
Status: DISCONTINUED | OUTPATIENT
Start: 2023-02-10 | End: 2023-02-10

## 2023-02-10 RX ORDER — DIAZEPAM 2 MG/1
2 TABLET ORAL ONCE
Status: COMPLETED | OUTPATIENT
Start: 2023-02-10 | End: 2023-02-10

## 2023-02-10 RX ORDER — AMLODIPINE BESYLATE 5 MG/1
5 TABLET ORAL DAILY
Status: DISCONTINUED | OUTPATIENT
Start: 2023-02-11 | End: 2023-02-11

## 2023-02-10 RX ORDER — PROCHLORPERAZINE EDISYLATE 5 MG/ML
5 INJECTION INTRAMUSCULAR; INTRAVENOUS EVERY 8 HOURS PRN
Status: DISCONTINUED | OUTPATIENT
Start: 2023-02-10 | End: 2023-02-24

## 2023-02-10 RX ORDER — MORPHINE SULFATE 2 MG/ML
2 INJECTION, SOLUTION INTRAMUSCULAR; INTRAVENOUS EVERY 2 HOUR PRN
Status: DISCONTINUED | OUTPATIENT
Start: 2023-02-10 | End: 2023-02-16

## 2023-02-10 RX ORDER — ONDANSETRON 2 MG/ML
4 INJECTION INTRAMUSCULAR; INTRAVENOUS EVERY 6 HOURS PRN
Status: DISCONTINUED | OUTPATIENT
Start: 2023-02-10 | End: 2023-02-24

## 2023-02-10 RX ORDER — ENOXAPARIN SODIUM 100 MG/ML
40 INJECTION SUBCUTANEOUS DAILY
Status: DISCONTINUED | OUTPATIENT
Start: 2023-02-10 | End: 2023-02-24

## 2023-02-10 NOTE — CM/SW NOTE
Physical Therapy Treatment Note     Name: Swati Durán  Clinic Number: [id number]    Therapy Diagnosis:   Encounter Diagnoses   Name Primary?    Decreased strength     Chronic left shoulder pain        Physician: Vianey Zamora MD    Visit Date: 8/24/2020    Physician Orders: PT Eval and Treat  Medical Diagnosis from Referral: chronic left shoulder pain  Evaluation Date: 7/28/2020  Authorization Period Expiration: 07/19/2021  Plan of Care Expiration: 10/26/2020  Visit # / Visits authorized: 4/20 (total visits 5)     PROGRESS NOTE: 8/28/2020    Time In: 11:45 am  Time Out: 12:37 pm  Total Billable Time: 49 minutes    Precautions: Standard and hypermobility    Subjective     Pt reports: Did well after last treatment she is not sore today.  She does report some pain after removing her raúl and de-tangling her hair (took about 2 1/2 hours to de-tangle). She reports that she mainly used her R arm.    She was compliant with home exercise program.  Response to previous treatment: None  Functional change: None    Pain: NT/10  Location: right shoulder, anterior and posterior aspects and within the joint    Objective     Swati received therapeutic exercises to develop strength, endurance and core stabilization for 36 minutes including:      ABC's 1x/ea 5# KB bottoms up, bilateral  Serratus punch, 8x 5# KB bottoms up, bilateral  Prone LT isometrics 2' 10s  Prone scap retraction 2' 10s  spidermans small circles CCW/CW yellow theraband 10x/ea B  ina V B, no abd,  10x2 B yellow theraband  Quadruped with core activation, 5s hold 2 reps of hold then rest, 4 sets  Quadruped ball push with UE reach 5x/each arm (unweighting only)   Plank on elbows/knees 5s x3  Wall plank on elbows 5s x5  Extended arms plank  1x 5s    Defer:  Quadruped with leg slide and lower trap activation 3x/ea  Plank on wall - elbows 15s x3  Subscapularis isolation    Swati received the following manual therapy techniques: Myofacial  Summary:    ANA MARÍA was attempting to obtain authorization for SNF placement for this patient. Per pt, wife and son, pt had PT eval yesterday and within hours was unable to ambulate. Pt is falling at home per wife. Pt wife states she is unable to care for pt. Aidin placed. ANA MARÍA spoke with Mario Alberto Uribe [phone] around 10am to start referral for SNF. Per Mitzy, pt has to be in the hospital to go to a SNF. Mitzy advised ANA MARÍA to call the Provider [phone]. ANA MARÍA spoke with Leola at [address] [place] [address] around [phone] to see if auth could be received since patient was just discharged from the hospital yesterday. Per Leola, a case can be started and request clinicals be faxed to [phone]. Pt case number [id number]. ER MD notes from today and yesterdays discharge note faxed to the above number. Per ER MD, pt is unable to ambulate and will be worked up to rule out progression of his stroke. release, Soft tissue Mobilization and manual releases were applied to the: lateral/medial scapular musculature and lats/infraspinatus, pectoral minor, upper trapezial/levator scapular musculature for 13 minutes pre and post treatment    Home Exercises Provided and Patient Education Provided     Education provided:   - HEP, scapular stability    Written Home Exercises Provided: Patient instructed to cont prior HEP.  Exercises were reviewed and Swati was able to demonstrate them prior to the end of the session.  Swati demonstrated good  understanding of the education provided.     See EMR under Patient Instructions for exercises provided prior visit.    Assessment     Good tolerance to all treatment, she continues to have increased difficulty stabilizing scapula in quadruped position and with closed chain elbow support.  She has difficulty stabilizing her scapula in a neutral position and continues to try and hold a protracted position of stability.  She continues to have difficulty with winging during any WB of UE's but was able to stabilize better during plank on elbows with verbal and tactile cues.    Swati is progressing well towards her goals.   Pt prognosis is Good.     Pt will continue to benefit from skilled outpatient physical therapy to address the deficits listed in the problem list box on initial evaluation, provide pt/family education and to maximize pt's level of independence in the home and community environment.     Pt's spiritual, cultural and educational needs considered and pt agreeable to plan of care and goals.     Anticipated barriers to physical therapy: history of repetitive dislocations    Goals:  Short Term Goals: In 4 weeks   1. Patient to be educated on HEP.  2. Patient to improve score on the FOTO to progress toward goal of returning to functional and recreational activities.  3. Patient to have pain less than 4/10 at worst in order to improve QOL.  4. Patient to improve strength by  1/2 MMT grade in L UE without pain to improve use of L UE for functional activities.  5. Patient will demonstrate improved scapular mobility to improve shoulder mechanics and decrease pain during ADLs.  6. Patient to be educated on postural and body mechanics awareness.     Long Term Goals: In 8 weeks  1. Patient will demonstrate independence in HEP with minimal cueing in order to ensure maintenance of gains.  2. Patient to improve score on FOTO to 10/100 or less to achieve goal of returning to functional and recreational activities.  3. Patient to have decreased pain to 1/10 at worst in order to improve QOL.  4. Patient to improve B UE strength to goals stated above to improve use of B UE for functional activities.  5. Patient to demo increase L shoulder AROM to goals stated above to improve ability to use L UE for ADLs.  6. Patient to perform work and recreational activities including tumbling, stunting, and front and lateral weighted raises without increased symptoms.         Plan     Plan of care Certification: 7/28/2020 to 10/26/2020.     Outpatient Physical Therapy 2 times weekly for 8 weeks to include any combination of the following interventions: virtual visits, dry needling, modalities, electrical stimulation (IFC, Pre-Mod, Attended with Functional Dry Needling), Cervical/Lumbar Traction, Gait Training, Manual Therapy, Moist Heat/ Ice, Neuromuscular Re-ed, Patient Education, Self Care, Therapeutic Activites, Therapeutic Exercise and Ultrasound     Monitor response to today's treatment and progress with PT POC as indicated and tolerated.    Ricarda Quintero, PT

## 2023-02-10 NOTE — ED QUICK NOTES
Orders for admission, patient is aware of plan and ready to go upstairs.  Any questions, please call ED RICCO chavez at extension [phone]     Patient Covid vaccination status: Unvaccinated     COVID Test Ordered in ED: Rapid SARS-CoV-2 by PCR was neg    COVID Suspicion at Admission: N/A    Running Infusions:  None    Mental Status/LOC at time of transport: a&ox3    Other pertinent information:   CIWA score: N/A   NIH score:  N/A

## 2023-02-10 NOTE — ED INITIAL ASSESSMENT (HPI)
Recently discharged from hospital due to stroke. Here due to SBP=>180. Per report on scheduled BP check by family, recent BP= >189. Pt denies any complaints.  L sided weakness due to first CVA

## 2023-02-10 NOTE — PLAN OF CARE
Assumed care of pt @ 0730. Pt is A/Ox 4. Left sided deficits, with left side neglect/inattention, slight left droop, left drift. No new deficits. On RA, VSS, SR on tele. IV saline locked. Tolerating diet. PT/OT recommending-home with day rehab  Pt denies pain  Up as tolerated w/ walker and assist  Intake/outputs WNL. Plan: dc home    Updated POC with patient and family. Will continue to monitor. Problem: Diabetes/Glucose Control  Goal: Glucose maintained within prescribed range  Description: INTERVENTIONS:  - Monitor Blood Glucose as ordered  - Assess for signs and symptoms of hyperglycemia and hypoglycemia  - Administer ordered medications to maintain glucose within target range  - Assess barriers to adequate nutritional intake and initiate nutrition consult as needed  - Instruct patient on self management of diabetes  Outcome: Adequate for Discharge     Problem: Patient/Family Goals  Goal: Patient/Family Long Term Goal  Description: Patient's Long Term Goal: Discharge    Interventions:  - MD Consults  - Stroke protocol  - IV Fluids  - Give medications as ordered  - See additional Care Plan goals for specific interventions  Outcome: Adequate for Discharge  Goal: Patient/Family Short Term Goal  Description: Patient's Short Term Goal: Continue stroke protocol. Interventions:   - Neuro checks and NIH   - Tele monitor/  - Accuchecks   - IV fluids  - PT/OT  - See additional Care Plan goals for specific interventions  Outcome: Adequate for Discharge     Problem: NEUROLOGICAL - ADULT  Goal: Achieves stable or improved neurological status  Description: INTERVENTIONS  - Assess for and report changes in neurological status  - Initiate measures to prevent increased intracranial pressure  - Maintain blood pressure and fluid volume within ordered parameters to optimize cerebral perfusion and minimize risk of hemorrhage  - Monitor temperature, glucose, and sodium.  Initiate appropriate interventions as ordered  Outcome: Adequate for Discharge

## 2023-02-11 ENCOUNTER — APPOINTMENT (OUTPATIENT)
Dept: GENERAL RADIOLOGY | Facility: HOSPITAL | Age: 63
DRG: 064 | End: 2023-02-11
Attending: HOSPITALIST
Payer: COMMERCIAL

## 2023-02-11 ENCOUNTER — APPOINTMENT (OUTPATIENT)
Dept: GENERAL RADIOLOGY | Facility: HOSPITAL | Age: 63
End: 2023-02-11
Attending: HOSPITALIST
Payer: COMMERCIAL

## 2023-02-11 PROBLEM — I63.9 ACUTE CVA (CEREBROVASCULAR ACCIDENT) (HCC): Status: ACTIVE | Noted: 2023-02-06

## 2023-02-11 LAB
ALBUMIN SERPL-MCNC: 3 G/DL (ref 3.4–5)
ALBUMIN/GLOB SERPL: 1 {RATIO} (ref 1–2)
ALP LIVER SERPL-CCNC: 93 U/L
ALT SERPL-CCNC: 35 U/L
ANION GAP SERPL CALC-SCNC: 9 MMOL/L (ref 0–18)
AST SERPL-CCNC: 32 U/L (ref 15–37)
BASOPHILS # BLD AUTO: 0.06 X10(3) UL (ref 0–0.2)
BASOPHILS NFR BLD AUTO: 0.6 %
BILIRUB SERPL-MCNC: 1 MG/DL (ref 0.1–2)
BUN BLD-MCNC: 25 MG/DL (ref 7–18)
CALCIUM BLD-MCNC: 8.6 MG/DL (ref 8.5–10.1)
CHLORIDE SERPL-SCNC: 110 MMOL/L (ref 98–112)
CO2 SERPL-SCNC: 22 MMOL/L (ref 21–32)
CREAT BLD-MCNC: 0.93 MG/DL
EOSINOPHIL # BLD AUTO: 0.22 X10(3) UL (ref 0–0.7)
EOSINOPHIL NFR BLD AUTO: 2.2 %
ERYTHROCYTE [DISTWIDTH] IN BLOOD BY AUTOMATED COUNT: 12.4 %
GFR SERPLBLD BASED ON 1.73 SQ M-ARVRAT: 92 ML/MIN/1.73M2 (ref 60–?)
GLOBULIN PLAS-MCNC: 3.1 G/DL (ref 2.8–4.4)
GLUCOSE BLD-MCNC: 115 MG/DL (ref 70–99)
GLUCOSE BLD-MCNC: 120 MG/DL (ref 70–99)
GLUCOSE BLD-MCNC: 124 MG/DL (ref 70–99)
GLUCOSE BLD-MCNC: 135 MG/DL (ref 70–99)
GLUCOSE BLD-MCNC: 143 MG/DL (ref 70–99)
HCT VFR BLD AUTO: 48.8 %
HGB BLD-MCNC: 16.5 G/DL
IMM GRANULOCYTES # BLD AUTO: 0.04 X10(3) UL (ref 0–1)
IMM GRANULOCYTES NFR BLD: 0.4 %
LYMPHOCYTES # BLD AUTO: 1.88 X10(3) UL (ref 1–4)
LYMPHOCYTES NFR BLD AUTO: 18.5 %
MCH RBC QN AUTO: 30.7 PG (ref 26–34)
MCHC RBC AUTO-ENTMCNC: 33.8 G/DL (ref 31–37)
MCV RBC AUTO: 90.7 FL
MONOCYTES # BLD AUTO: 0.73 X10(3) UL (ref 0.1–1)
MONOCYTES NFR BLD AUTO: 7.2 %
NEUTROPHILS # BLD AUTO: 7.25 X10 (3) UL (ref 1.5–7.7)
NEUTROPHILS # BLD AUTO: 7.25 X10(3) UL (ref 1.5–7.7)
NEUTROPHILS NFR BLD AUTO: 71.1 %
OSMOLALITY SERPL CALC.SUM OF ELEC: 298 MOSM/KG (ref 275–295)
PLATELET # BLD AUTO: 211 10(3)UL (ref 150–450)
POTASSIUM SERPL-SCNC: 3.4 MMOL/L (ref 3.5–5.1)
PROT SERPL-MCNC: 6.1 G/DL (ref 6.4–8.2)
RBC # BLD AUTO: 5.38 X10(6)UL
SODIUM SERPL-SCNC: 141 MMOL/L (ref 136–145)
WBC # BLD AUTO: 10.2 X10(3) UL (ref 4–11)

## 2023-02-11 PROCEDURE — 99233 SBSQ HOSP IP/OBS HIGH 50: CPT | Performed by: HOSPITALIST

## 2023-02-11 PROCEDURE — 73562 X-RAY EXAM OF KNEE 3: CPT | Performed by: HOSPITALIST

## 2023-02-11 RX ORDER — AMLODIPINE BESYLATE 5 MG/1
10 TABLET ORAL DAILY
Status: DISCONTINUED | OUTPATIENT
Start: 2023-02-12 | End: 2023-02-24

## 2023-02-11 RX ORDER — AMLODIPINE BESYLATE 5 MG/1
5 TABLET ORAL ONCE
Status: COMPLETED | OUTPATIENT
Start: 2023-02-11 | End: 2023-02-11

## 2023-02-11 RX ORDER — POTASSIUM CHLORIDE 20 MEQ/1
40 TABLET, EXTENDED RELEASE ORAL ONCE
Status: DISCONTINUED | OUTPATIENT
Start: 2023-02-11 | End: 2023-02-11

## 2023-02-11 NOTE — SLP NOTE
Orders received for bedside swallow evaluation. Spoke with RN Surendra Retana who states the patient is currently off the floor for testing. SLP will follow up again this afternoon as schedule allows.     Katina Ga   Speech Language Pathologist  Office phone: [phone]  Pager: [phone], #[phone]

## 2023-02-11 NOTE — PLAN OF CARE
Resumed care at 0730. Aox4, drowsy but arousable. L sided facial droop, L sided weakness. Notified Dr. Mary Lou Nettles and Dr. Mitchel Young of worsening L sided weakness, both at bedside. Speech therapy to keep NPO and re-evaluate. C/o L knee/leg pain, morphine given as ordered with relief. X-rays done. Vitals stable.

## 2023-02-11 NOTE — PROGRESS NOTES
02/10/23 2128   Provider Notification   Reason for Communication Review case  (New MRI results)   Provider Name Cruzito Benitez DO   Method of Communication Page   Response No new orders   Notification Time 2128

## 2023-02-11 NOTE — PLAN OF CARE
Assumed care at [address]. No acute distress noted. L sided weakness from residual stroke. Pt A&Ox4. Room air. NSR on tele. BP stable, SBP parameters 110-170.  1800cc diet. Accucheck QID. Bed rest. PT/OT to see tomorrow. Incontinent. Brief and anaya in place. Medicated per MAR. C/o L leg pain, see MAR. NS 0.9 infusing @ 100mL/hr per order. MRI completed this shift, MD updated on results, no new orders. Family and patient updated on POC. Safety precautions in place. All needs met at this time.

## 2023-02-11 NOTE — PROGRESS NOTES
NURSING ADMISSION NOTE      Patient admitted via Cart  Oriented to room. Safety precautions initiated. Bed in low position. Call light in reach. Pt A&Ox4. Severe pain to Lt knee. Lt sided weakness from previous stroke. No new deficits noted. VSS on RA. Admission navigator completed. Notified neuro of consult. 0.9 infusing @ 100ml/hr via RT AC PIV. Updated patient and family of POC. Safety precaution in places. All needs met at this time.

## 2023-02-12 LAB
GLUCOSE BLD-MCNC: 119 MG/DL (ref 70–99)
GLUCOSE BLD-MCNC: 125 MG/DL (ref 70–99)
GLUCOSE BLD-MCNC: 147 MG/DL (ref 70–99)
GLUCOSE BLD-MCNC: 152 MG/DL (ref 70–99)
POTASSIUM SERPL-SCNC: 3.9 MMOL/L (ref 3.5–5.1)

## 2023-02-12 PROCEDURE — 99232 SBSQ HOSP IP/OBS MODERATE 35: CPT | Performed by: HOSPITALIST

## 2023-02-12 PROCEDURE — 99232 SBSQ HOSP IP/OBS MODERATE 35: CPT | Performed by: OTHER

## 2023-02-12 RX ORDER — CLOPIDOGREL BISULFATE 75 MG/1
75 TABLET ORAL DAILY
Status: DISCONTINUED | OUTPATIENT
Start: 2023-02-13 | End: 2023-02-24

## 2023-02-12 RX ORDER — ASPIRIN 81 MG/1
81 TABLET ORAL DAILY
Status: DISCONTINUED | OUTPATIENT
Start: 2023-02-13 | End: 2023-02-24

## 2023-02-12 RX ORDER — LOSARTAN POTASSIUM 100 MG/1
100 TABLET ORAL DAILY
Status: DISCONTINUED | OUTPATIENT
Start: 2023-02-12 | End: 2023-02-24

## 2023-02-12 NOTE — SLP NOTE
SPEECH DAILY NOTE - INPATIENT    ASSESSMENT & PLAN   ASSESSMENT  The patient was seen in room, at bedside for repeat bedside swallow evaluation. The patient is currently NPO except for meds crushed in applesauce. The patient is increasingly alert this date, with eyes open for entirety of session. The patient continues to present with left-sided neglect and facial droop. The patient has very minimal movement of tongue bilaterally. The patient was given trials of thin liquids via teaspoon, cup and straw. The patient demonstrates the best oral control with bolus administration via straw on the right side. Suspect delay in pharyngeal response with decreased laryngeal elevation. The patient demonstrates cough response with initial thin liquid trial via teaspoon and with large sip of thin liquid from the straw. No signs of aspiration noted with trials of thin liquids in small amounts via straw with right sided positioning. The patient was given trial of nectar thick liquid via cup. The patient demonstrates decreased bolus control, with slowed A-P transfer. Suspect mistimed swallow response with audible gulping during the swallow, and immediate cough response. The patient was given trials of puree and soft solids. Due to decreased lingual movement, the patient demonstrates prolonged mastication time and decreased bolus formation. Mild diffuse oral residue was noted after the swallow with soft solids, but clears with subsequent small sip of thin liquid wash from straw with right sided placement. SLP will complete videofluoroscopic swallow study to further assess oropharyngeal swallow and to determine parameters of dysphagia treatment. Diet Recommendations - Solids: Mechanical soft ground/ Minced & Moist  Diet Recommendations - Liquids: Thin Liquids     Discussed recommendations and plan of care with Dr. Lars Max, with RN Abimbola Melton, and with patient.   Education also provided to [facility] regarding feeding guidelines. Compensatory Strategies Recommended: To right;Slow rate;Small bites and sips; Alternate consistencies  Aspiration Precautions: Upright position  Medication Administration Recommendations: Crushed in puree    Patient Experiencing Pain: Yes  Pain Ratin  Pain Location: left leg  Pain Control: PO meds  Nursing Aware of Pain?: Yes    Discharge Recommendations  Discharge Recommendations/Plan: Acute rehabilitation    Treatment Plan  Treatment Plan/Recommendations: Dysphagia therapy;Communication evaluation    Interdisciplinary Communication: Discussed with RN  Disussed with other staff  Discussed with physician            GOALS  Goal #1 The patient will tolerate minced and moist consistency and thin liquids without overt signs or symptoms of aspiration with 100 % accuracy over 2 session(s). In Progress   Goal #2 The patient/family/caregiver will demonstrate understanding and implementation of aspiration precautions and swallow strategies independently over 4 session(s). In Progress   Goal #3 The patient will participate in VFSS to further assess oropharyngeal swallow and to determine parameters of dysphagia treatment. Within 1-2 days. In Progress   Goal #4 The patient will participate in cognitive-linguistic evaluation with SLP within 1-3 days.      In Progress     FOLLOW UP  Follow Up Needed (Documentation Required): Yes  SLP Follow-up Date: 23  Number of Visits to Meet Established Goals: 4    Session: 2 of 4    If you have any questions, please contact OSCAR Bernabe Weblinger Gürtel   Speech Language Pathologist  Office phone: [phone]  Pager: [phone], #[phone]

## 2023-02-12 NOTE — PLAN OF CARE
Assumed patient care at 7:30. A/Ox4, drowsy. Room air. Normal sinus on tele. Bp parameter goal met. NPO till speech reassess, ok for meds crushed in apple sauce. Speech to reevaluate. QID accucheck. Morphine given prn per MAR. Max assist. RAC PIV c/d/I, saline locked. All safety precautions are in place and will continue with plan of care. Problem: NEUROLOGICAL - ADULT  Goal: Achieves stable or improved neurological status  Description: INTERVENTIONS  - Assess for and report changes in neurological status  - Initiate measures to prevent increased intracranial pressure  - Maintain blood pressure and fluid volume within ordered parameters to optimize cerebral perfusion and minimize risk of hemorrhage  - Monitor temperature, glucose, and sodium.  Initiate appropriate interventions as ordered  Outcome: Progressing  Goal: Achieves maximal functionality and self care  Description: INTERVENTIONS  - Monitor swallowing and airway patency with patient fatigue and changes in neurological status  - Encourage and assist patient to increase activity and self care with guidance from PT/OT  - Encourage visually impaired, hearing impaired and aphasic patients to use assistive/communication devices  Outcome: Progressing     Problem: PAIN - ADULT  Goal: Verbalizes/displays adequate comfort level or patient's stated pain goal  Description: INTERVENTIONS:  - Encourage pt to monitor pain and request assistance  - Assess pain using appropriate pain scale  - Administer analgesics based on type and severity of pain and evaluate response  - Implement non-pharmacological measures as appropriate and evaluate response  - Consider cultural and social influences on pain and pain management  - Manage/alleviate anxiety  - Utilize distraction and/or relaxation techniques  - Monitor for opioid side effects  - Notify MD/LIP if interventions unsuccessful or patient reports new pain  - Anticipate increased pain with activity and pre-medicate as appropriate  Outcome: Progressing

## 2023-02-12 NOTE — PLAN OF CARE
Rec'd report from previous RN, care assumed at 2100, pt assessed per flow sheets. Pt with left sided weakness and facial droop, though appears unchanged from previous assessments. BP remains within parameters ordered. Pt with external urinary pouch in place. Pt complains of pain to left knee, though notes some improvement after medication given per previous RN. Pt family at bedside, updated on plan of care, verbalized understanding and questions answered. Call light in reach.

## 2023-02-13 ENCOUNTER — TELEPHONE (OUTPATIENT)
Dept: CASE MANAGEMENT | Facility: HOSPITAL | Age: 63
End: 2023-02-13

## 2023-02-13 ENCOUNTER — APPOINTMENT (OUTPATIENT)
Dept: GENERAL RADIOLOGY | Facility: HOSPITAL | Age: 63
DRG: 064 | End: 2023-02-13
Attending: HOSPITALIST
Payer: COMMERCIAL

## 2023-02-13 ENCOUNTER — APPOINTMENT (OUTPATIENT)
Dept: GENERAL RADIOLOGY | Facility: HOSPITAL | Age: 63
End: 2023-02-13
Attending: HOSPITALIST
Payer: COMMERCIAL

## 2023-02-13 LAB
GLUCOSE BLD-MCNC: 129 MG/DL (ref 70–99)
GLUCOSE BLD-MCNC: 136 MG/DL (ref 70–99)
GLUCOSE BLD-MCNC: 153 MG/DL (ref 70–99)
GLUCOSE BLD-MCNC: 174 MG/DL (ref 70–99)

## 2023-02-13 PROCEDURE — 99232 SBSQ HOSP IP/OBS MODERATE 35: CPT | Performed by: HOSPITALIST

## 2023-02-13 PROCEDURE — 74230 X-RAY XM SWLNG FUNCJ C+: CPT | Performed by: HOSPITALIST

## 2023-02-13 RX ORDER — HYDROCODONE BITARTRATE AND ACETAMINOPHEN 5; 325 MG/1; MG/1
2 TABLET ORAL EVERY 4 HOURS PRN
Status: DISCONTINUED | OUTPATIENT
Start: 2023-02-13 | End: 2023-02-20

## 2023-02-13 RX ORDER — HYDROCODONE BITARTRATE AND ACETAMINOPHEN 5; 325 MG/1; MG/1
1 TABLET ORAL EVERY 4 HOURS PRN
Status: DISCONTINUED | OUTPATIENT
Start: 2023-02-13 | End: 2023-02-20

## 2023-02-13 NOTE — PLAN OF CARE
A&Ox4. On room air, lungs clear and diminished. Abdomen soft and nontender, Denies N/V/D. Left side weakness/neglect. C/o pain to left leg and left knee- PRN Morphine given with relief. Right AC PIV saline locked. Briefed and primofit in place. Patient did not eat dinner. Max assist. Plan is for patient to have a Video Swallow today. PT recommending Acute Rehab. Needs met. Will monitor. Problem: NEUROLOGICAL - ADULT  Goal: Achieves stable or improved neurological status  Description: INTERVENTIONS  - Assess for and report changes in neurological status  - Initiate measures to prevent increased intracranial pressure  - Maintain blood pressure and fluid volume within ordered parameters to optimize cerebral perfusion and minimize risk of hemorrhage  - Monitor temperature, glucose, and sodium.  Initiate appropriate interventions as ordered  Outcome: Progressing  Goal: Achieves maximal functionality and self care  Description: INTERVENTIONS  - Monitor swallowing and airway patency with patient fatigue and changes in neurological status  - Encourage and assist patient to increase activity and self care with guidance from PT/OT  - Encourage visually impaired, hearing impaired and aphasic patients to use assistive/communication devices  Outcome: Progressing     Problem: PAIN - ADULT  Goal: Verbalizes/displays adequate comfort level or patient's stated pain goal  Description: INTERVENTIONS:  - Encourage pt to monitor pain and request assistance  - Assess pain using appropriate pain scale  - Administer analgesics based on type and severity of pain and evaluate response  - Implement non-pharmacological measures as appropriate and evaluate response  - Consider cultural and social influences on pain and pain management  - Manage/alleviate anxiety  - Utilize distraction and/or relaxation techniques  - Monitor for opioid side effects  - Notify MD/LIP if interventions unsuccessful or patient reports new pain  - Anticipate increased pain with activity and pre-medicate as appropriate  Outcome: Progressing

## 2023-02-13 NOTE — CM/SW NOTE
Yaya RN case manager with [facility] calling to offer discharge planning assisatcne. If needed please call back at [phone].

## 2023-02-13 NOTE — PHYSICAL THERAPY NOTE
PHYSICAL THERAPY TREATMENT NOTE - INPATIENT    Room Number: 9354/7318-L     Session: 1/7     Number of Visits to Meet Established Goals: 7    Presenting Problem: left-sided weakness  Co-Morbidities : recent CVA, DM, HTN     History related to current admission: Patient is a 57year old male admitted on 2/10/2023 from home for left-sided weakness. 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 Pt was ambulating well but then developed increased left-sided weakness and fell at home. Xrays of left hip and femur negative for acute changes. Doppler negative for DVT. Left knee Xray ordered by MD who entered room during PT eval. Per MD, ok for PT to work with pt today. MRI 2/10/23  Impression   CONCLUSION:     1. Re-identified is patient's acute infarct predominantly involving the right thalamus noted on 2/6/2023 MRI. Since then, there is increasing restricted diffusion involving the superior lateral aspect of the thalamus consistent with ongoing acute   infarct. ASSESSMENT     Pt seen this am for treatment and demonstrating minimal progress at this time. Pt has very flat affect and prominent L sided neglect that is present beginning at midline/center. Pt is agreeable to participate, however stating he is very tired. Pt requiring 2 person max A for bed mobility and squat pivot t/f from EOB>VSS chair. Pt challenged c repositioning self, however able to follow inconsistent 1 step simple commands. Pt will cont to benefit from skilled IP PT services in order to maximize function. Rec DC to [facility] when medically appropriate. DISCHARGE RECOMMENDATIONS  PT Discharge Recommendations: Acute rehabilitation     PLAN  PT Treatment Plan: Bed mobility; Body mechanics; Coordination; Endurance; Energy conservation;Patient education; Family education;Gait training;Neuromuscular re-educate;Strengthening;Range of motion;Transfer training;Balance training  Rehab Potential : Good  Frequency (Obs): 3-5x/week    CURRENT GOALS     Goal #1 Patient is able to demonstrate supine - sit EOB @ level: moderate assistance      Goal #2 Patient is able to demonstrate transfers Sit to/from Stand at assistance level: maximum assistance      Goal #3 Patient is able to sit EOB >5 min with at least poor balance      Goal #4     Goal #5     Goal #6     Goal Comments: Goals established on 2/11/2023-updated on 2/13/21    SUBJECTIVE  \"I'm tired\"    OBJECTIVE  Precautions: Bed/chair alarm; Other (Comment) (-170)    WEIGHT BEARING RESTRICTION  Weight Bearing Restriction: None                PAIN ASSESSMENT   Rating: Unable to rate  Location: L knee  Management Techniques: Activity promotion; Body mechanics;Repositioning    BALANCE                                                                                                                       Static Sitting: Poor -  Dynamic Sitting: Poor -           Static Standing: Not tested  Dynamic Standing: Not tested    ACTIVITY TOLERANCE                         O2 WALK         AM-PAC '6-Clicks' INPATIENT SHORT FORM - BASIC MOBILITY  How much difficulty does the patient currently have. .. Patient Difficulty: Turning over in bed (including adjusting bedclothes, sheets and blankets)?: A Lot   Patient Difficulty: Sitting down on and standing up from a chair with arms (e.g., wheelchair, bedside commode, etc.): Unable   Patient Difficulty: Moving from lying on back to sitting on the side of the bed?: Unable   How much help from another person does the patient currently need. ..    Help from Another: Moving to and from a bed to a chair (including a wheelchair)?: Total   Help from Another: Need to walk in hospital room?: Total   Help from Another: Climbing 3-5 steps with a railing?: Total       AM-PAC Score:  Raw Score: 7   Approx Degree of Impairment: 92.36%   Standardized Score (AM-PAC Scale): 26.42   CMS Modifier (G-Code): CM    FUNCTIONAL ABILITY STATUS  Gait Assessment   Functional Mobility/Gait Assessment  Gait Assistance: Not tested  Distance (ft): n/a  Assistive Device: None    Skilled Therapy Provided    Bed Mobility:  Rolling: NT   Supine<>Sit: max Ax2   Sit<>Supine: NT     Transfer Mobility:  Sit<>Stand: NT   Stand<>Sit: NT   Gait: NT    Therapist's Comments: Pt presents to PT lying in semi supine position in bed. Pt is agreeable to participate, however very flat affect and prominent L sided neglect-LUE flaccid, however LLE presenting with mod flexor tone and noting discomfort when attempting to move LLE or ext knee. Pt repositioned to sitting at EOB c max Ax2-however attempting to assist c RUE and LE. Unable to scoot or reposition, however is able to WB through RUE and assist intermittently c static posture. Heavy L lat trunk lean noted in sitting requiring max A to maintain midline. VSS chair placed adjacent to EOB on his R. Pt was directed to make visual contact c R arm rail and was attempting to reach for rail. Pt required max Ax2 to squat pivot to the VSS chair. Pt attempting to push off c RLE, however unable to generate enough force to scoot his bottom back. Repositioned c 3 person assist, buckled in and pillow support under LUE to deter L lat trunk lean. RN made aware of session. Patient End of Session: With [facility] staff;Needs met;RN aware of session/findings; All patient questions and concerns addressed; Discussed recommendations with /; Other (Comment) (in VSS chair c transport)    PT Session Time: 25 minutes  Therapeutic Activity: 15 minutes

## 2023-02-13 NOTE — CM/SW NOTE
MSW reviewed chart, PMR placed on 2/11. MSW sent message to [facility] Liaison to check status. 10am  Liaison messaged back that pt will be seen today    11am  MSW spoke to pt, he would like to go to [facility] if accepted. PMR consult pending. 4pm  MSW met with pt and spouse at bedside. We discussed several [facility] options in area. They want referral to [facility]. MSW updated [facility] admissions.     Barrier to [employer]:   Medical Clearance  Insurance auth for [employer]

## 2023-02-14 LAB
GLUCOSE BLD-MCNC: 159 MG/DL (ref 70–99)
GLUCOSE BLD-MCNC: 163 MG/DL (ref 70–99)
GLUCOSE BLD-MCNC: 169 MG/DL (ref 70–99)
GLUCOSE BLD-MCNC: 171 MG/DL (ref 70–99)

## 2023-02-14 PROCEDURE — 99232 SBSQ HOSP IP/OBS MODERATE 35: CPT | Performed by: HOSPITALIST

## 2023-02-14 RX ORDER — ACETAMINOPHEN 325 MG/1
650 TABLET ORAL 3 TIMES DAILY
Status: DISCONTINUED | OUTPATIENT
Start: 2023-02-14 | End: 2023-02-24

## 2023-02-14 RX ORDER — HYDRALAZINE HYDROCHLORIDE 25 MG/1
25 TABLET, FILM COATED ORAL EVERY 8 HOURS SCHEDULED
Status: DISCONTINUED | OUTPATIENT
Start: 2023-02-14 | End: 2023-02-16

## 2023-02-14 NOTE — PLAN OF CARE
Assumed patient care at 7:30. A/Ox4, drowsy. Room air. Normal sinus on tele. Bp parameter goal met. Speech evaluated Video swallow completed. Carb control, Soft/easy to chew diet. Meds taken with apple sauce. QID accucheck. Morphine and norco given prn per MAR. Max assist. RAC PIV c/d/I, saline locked. Plan is to dc to [facility]. PMR assessed today. All safety precautions are in place and will continue with plan of care. Problem: NEUROLOGICAL - ADULT  Goal: Achieves stable or improved neurological status  Description: INTERVENTIONS  - Assess for and report changes in neurological status  - Initiate measures to prevent increased intracranial pressure  - Maintain blood pressure and fluid volume within ordered parameters to optimize cerebral perfusion and minimize risk of hemorrhage  - Monitor temperature, glucose, and sodium.  Initiate appropriate interventions as ordered  Outcome: Progressing  Goal: Achieves maximal functionality and self care  Description: INTERVENTIONS  - Monitor swallowing and airway patency with patient fatigue and changes in neurological status  - Encourage and assist patient to increase activity and self care with guidance from PT/OT  - Encourage visually impaired, hearing impaired and aphasic patients to use assistive/communication devices  Outcome: Progressing     Problem: PAIN - ADULT  Goal: Verbalizes/displays adequate comfort level or patient's stated pain goal  Description: INTERVENTIONS:  - Encourage pt to monitor pain and request assistance  - Assess pain using appropriate pain scale  - Administer analgesics based on type and severity of pain and evaluate response  - Implement non-pharmacological measures as appropriate and evaluate response  - Consider cultural and social influences on pain and pain management  - Manage/alleviate anxiety  - Utilize distraction and/or relaxation techniques  - Monitor for opioid side effects  - Notify MD/LIP if interventions unsuccessful or patient reports new pain  - Anticipate increased pain with activity and pre-medicate as appropriate  Outcome: Progressing

## 2023-02-14 NOTE — DISCHARGE INSTRUCTIONS
Continue PT/OT  Fall precautions  Blood pressure management with goal 100-150  Needs to follow up with neurology as outpatient  Continue DAPT for total 21 days then ASA alone per neuro recs  Continue TF via peg, monitor peg site for bleeding, continue abdominal binder. Diet Recommendations - Solids: Puree  Diet Recommendations - Liquids: Thin Liquids  Compensatory Strategies Recommended: Slow rate; Alternate consistencies; Extra sauce/gravy  Aspiration Precautions: Upright position  Medication Administration Recommendations: Crushed in puree

## 2023-02-14 NOTE — CM/SW NOTE
2pm  MSW spoke to Paul Ervin at [facility] admissions, [address] still pending    3pm  Wife updated that [address] is still pending

## 2023-02-14 NOTE — PLAN OF CARE
A&Ox4. On room air, lungs clear and diminished. Abdomen soft and nontender, Denies N/V/D. Left side weakness/neglect. Denies pain or discomfort. Lidocaine patch to left knee in place and is working well for patient. Right AC PIV saline locked. Briefed and primofit in place. PT recommending Acute Rehab- plan is for patient to discharge to [facility] pending insurance [facility]. Needs met. Will monitor. 0600: Primofit changed. Right AC PIV site noted to be red- IV removed and new one placed to right forearm. C/o pain to left leg- PRN Norco given. /96- scheduled Metoprolol given. AM dose of Losartan and Amlodipine given early per MD order. Will monitor. Problem: NEUROLOGICAL - ADULT  Goal: Achieves stable or improved neurological status  Description: INTERVENTIONS  - Assess for and report changes in neurological status  - Initiate measures to prevent increased intracranial pressure  - Maintain blood pressure and fluid volume within ordered parameters to optimize cerebral perfusion and minimize risk of hemorrhage  - Monitor temperature, glucose, and sodium.  Initiate appropriate interventions as ordered  Outcome: Progressing  Goal: Achieves maximal functionality and self care  Description: INTERVENTIONS  - Monitor swallowing and airway patency with patient fatigue and changes in neurological status  - Encourage and assist patient to increase activity and self care with guidance from PT/OT  - Encourage visually impaired, hearing impaired and aphasic patients to use assistive/communication devices  Outcome: Progressing     Problem: PAIN - ADULT  Goal: Verbalizes/displays adequate comfort level or patient's stated pain goal  Description: INTERVENTIONS:  - Encourage pt to monitor pain and request assistance  - Assess pain using appropriate pain scale  - Administer analgesics based on type and severity of pain and evaluate response  - Implement non-pharmacological measures as appropriate and evaluate response  - Consider cultural and social influences on pain and pain management  - Manage/alleviate anxiety  - Utilize distraction and/or relaxation techniques  - Monitor for opioid side effects  - Notify MD/LIP if interventions unsuccessful or patient reports new pain  - Anticipate increased pain with activity and pre-medicate as appropriate  Outcome: Progressing

## 2023-02-15 LAB
ALBUMIN SERPL-MCNC: 3.1 G/DL (ref 3.4–5)
ALBUMIN/GLOB SERPL: 0.9 {RATIO} (ref 1–2)
ALP LIVER SERPL-CCNC: 99 U/L
ALT SERPL-CCNC: 37 U/L
ANION GAP SERPL CALC-SCNC: 6 MMOL/L (ref 0–18)
AST SERPL-CCNC: 32 U/L (ref 15–37)
BASOPHILS # BLD AUTO: 0.07 X10(3) UL (ref 0–0.2)
BASOPHILS NFR BLD AUTO: 0.6 %
BILIRUB SERPL-MCNC: 1 MG/DL (ref 0.1–2)
BUN BLD-MCNC: 31 MG/DL (ref 7–18)
CALCIUM BLD-MCNC: 9.2 MG/DL (ref 8.5–10.1)
CHLORIDE SERPL-SCNC: 110 MMOL/L (ref 98–112)
CO2 SERPL-SCNC: 24 MMOL/L (ref 21–32)
CREAT BLD-MCNC: 1.01 MG/DL
EOSINOPHIL # BLD AUTO: 0.08 X10(3) UL (ref 0–0.7)
EOSINOPHIL NFR BLD AUTO: 0.7 %
ERYTHROCYTE [DISTWIDTH] IN BLOOD BY AUTOMATED COUNT: 12.3 %
GFR SERPLBLD BASED ON 1.73 SQ M-ARVRAT: 83 ML/MIN/1.73M2 (ref 60–?)
GLOBULIN PLAS-MCNC: 3.6 G/DL (ref 2.8–4.4)
GLUCOSE BLD-MCNC: 159 MG/DL (ref 70–99)
GLUCOSE BLD-MCNC: 181 MG/DL (ref 70–99)
GLUCOSE BLD-MCNC: 182 MG/DL (ref 70–99)
GLUCOSE BLD-MCNC: 189 MG/DL (ref 70–99)
GLUCOSE BLD-MCNC: 195 MG/DL (ref 70–99)
HCT VFR BLD AUTO: 51 %
HGB BLD-MCNC: 17.1 G/DL
IMM GRANULOCYTES # BLD AUTO: 0.03 X10(3) UL (ref 0–1)
IMM GRANULOCYTES NFR BLD: 0.3 %
LYMPHOCYTES # BLD AUTO: 1.61 X10(3) UL (ref 1–4)
LYMPHOCYTES NFR BLD AUTO: 14.8 %
MCH RBC QN AUTO: 30.5 PG (ref 26–34)
MCHC RBC AUTO-ENTMCNC: 33.5 G/DL (ref 31–37)
MCV RBC AUTO: 91.1 FL
MONOCYTES # BLD AUTO: 0.83 X10(3) UL (ref 0.1–1)
MONOCYTES NFR BLD AUTO: 7.6 %
NEUTROPHILS # BLD AUTO: 8.29 X10 (3) UL (ref 1.5–7.7)
NEUTROPHILS # BLD AUTO: 8.29 X10(3) UL (ref 1.5–7.7)
NEUTROPHILS NFR BLD AUTO: 76 %
OSMOLALITY SERPL CALC.SUM OF ELEC: 302 MOSM/KG (ref 275–295)
PLATELET # BLD AUTO: 223 10(3)UL (ref 150–450)
POTASSIUM SERPL-SCNC: 3.7 MMOL/L (ref 3.5–5.1)
PROT SERPL-MCNC: 6.7 G/DL (ref 6.4–8.2)
RBC # BLD AUTO: 5.6 X10(6)UL
SODIUM SERPL-SCNC: 140 MMOL/L (ref 136–145)
WBC # BLD AUTO: 10.9 X10(3) UL (ref 4–11)

## 2023-02-15 PROCEDURE — 99232 SBSQ HOSP IP/OBS MODERATE 35: CPT | Performed by: HOSPITALIST

## 2023-02-15 NOTE — PLAN OF CARE
A&Ox4. Appears depressed. On room air, lungs clear and diminished. Oral care done at bedtime. Abdomen soft and nontender, Denies N/V/D. Left side weakness/neglect- pillow support, turned and repositioned. C/o pain to left knee and leg pain- schedule Tylenol and PRN Norco given with relief. Encouraged patient to stretch left leg. Right forearm PIV saline locked. Briefed and primofit in place. Plan is for patient to discharge to [facility] pending insurance auth. Needs met. Will monitor. Problem: NEUROLOGICAL - ADULT  Goal: Achieves stable or improved neurological status  Description: INTERVENTIONS  - Assess for and report changes in neurological status  - Initiate measures to prevent increased intracranial pressure  - Maintain blood pressure and fluid volume within ordered parameters to optimize cerebral perfusion and minimize risk of hemorrhage  - Monitor temperature, glucose, and sodium.  Initiate appropriate interventions as ordered  Outcome: Progressing  Goal: Achieves maximal functionality and self care  Description: INTERVENTIONS  - Monitor swallowing and airway patency with patient fatigue and changes in neurological status  - Encourage and assist patient to increase activity and self care with guidance from PT/OT  - Encourage visually impaired, hearing impaired and aphasic patients to use assistive/communication devices  Outcome: Progressing     Problem: PAIN - ADULT  Goal: Verbalizes/displays adequate comfort level or patient's stated pain goal  Description: INTERVENTIONS:  - Encourage pt to monitor pain and request assistance  - Assess pain using appropriate pain scale  - Administer analgesics based on type and severity of pain and evaluate response  - Implement non-pharmacological measures as appropriate and evaluate response  - Consider cultural and social influences on pain and pain management  - Manage/alleviate anxiety  - Utilize distraction and/or relaxation techniques  - Monitor for opioid side effects  - Notify MD/LIP if interventions unsuccessful or patient reports new pain  - Anticipate increased pain with activity and pre-medicate as appropriate  Outcome: Progressing

## 2023-02-15 NOTE — CM/SW NOTE
Message sent to Alex Hinds at [facility] to inquire about status of insurance auth. Pt medically cleared for discharge when auth is received. 1100: Alex Hinds from [facility] notified CM/SW that Juliana Langley has been received for [facility]. Awaiting medical clearance.     Greg Vergara, BSN, RN-BC    [id number]

## 2023-02-16 LAB
GLUCOSE BLD-MCNC: 149 MG/DL (ref 70–99)
GLUCOSE BLD-MCNC: 164 MG/DL (ref 70–99)
GLUCOSE BLD-MCNC: 183 MG/DL (ref 70–99)
GLUCOSE BLD-MCNC: 191 MG/DL (ref 70–99)
GLUCOSE BLD-MCNC: 192 MG/DL (ref 70–99)

## 2023-02-16 PROCEDURE — 99232 SBSQ HOSP IP/OBS MODERATE 35: CPT | Performed by: HOSPITALIST

## 2023-02-16 RX ORDER — CLOPIDOGREL BISULFATE 75 MG/1
75 TABLET ORAL DAILY
Qty: 17 TABLET | Refills: 0 | Status: SHIPPED | OUTPATIENT
Start: 2023-02-17 | End: 2023-03-06

## 2023-02-16 RX ORDER — CYCLOBENZAPRINE HCL 5 MG
5 TABLET ORAL 3 TIMES DAILY PRN
Qty: 30 TABLET | Refills: 0 | Status: SHIPPED | OUTPATIENT
Start: 2023-02-16

## 2023-02-16 RX ORDER — INSULIN DETEMIR 100 [IU]/ML
5 INJECTION, SOLUTION SUBCUTANEOUS 2 TIMES DAILY
Qty: 9 ML | Refills: 0 | Status: SHIPPED | OUTPATIENT
Start: 2023-02-16 | End: 2023-02-24

## 2023-02-16 RX ORDER — HYDROCODONE BITARTRATE AND ACETAMINOPHEN 5; 325 MG/1; MG/1
1 TABLET ORAL EVERY 4 HOURS PRN
Qty: 20 TABLET | Refills: 0 | Status: SHIPPED | OUTPATIENT
Start: 2023-02-16

## 2023-02-16 RX ORDER — PEN NEEDLE, DIABETIC 32GX 5/32"
NEEDLE, DISPOSABLE MISCELLANEOUS
Qty: 100 EACH | Refills: 6 | Status: SHIPPED | OUTPATIENT
Start: 2023-02-16

## 2023-02-16 RX ORDER — HYDRALAZINE HYDROCHLORIDE 50 MG/1
50 TABLET, FILM COATED ORAL EVERY 8 HOURS SCHEDULED
Status: DISCONTINUED | OUTPATIENT
Start: 2023-02-16 | End: 2023-02-24

## 2023-02-16 RX ORDER — ASPIRIN 325 MG
325 TABLET ORAL DAILY
Qty: 30 TABLET | Refills: 2 | Status: SHIPPED | OUTPATIENT
Start: 2023-03-07

## 2023-02-16 RX ORDER — CYCLOBENZAPRINE HCL 5 MG
5 TABLET ORAL 3 TIMES DAILY PRN
Status: DISCONTINUED | OUTPATIENT
Start: 2023-02-16 | End: 2023-02-24

## 2023-02-16 RX ORDER — ASPIRIN 81 MG/1
81 TABLET ORAL DAILY
Qty: 17 TABLET | Refills: 0 | Status: SHIPPED | OUTPATIENT
Start: 2023-02-17 | End: 2023-03-06

## 2023-02-16 RX ORDER — INSULIN LISPRO 100 [IU]/ML
INJECTION, SOLUTION INTRAVENOUS; SUBCUTANEOUS
Qty: 15 ML | Refills: 0 | Status: SHIPPED | OUTPATIENT
Start: 2023-02-16

## 2023-02-16 NOTE — PLAN OF CARE
NSR, room air. Left arm flaccid, pt bends left leg, painful to straighten. Ortho consult, no aspiration. Cold pack q shift. Pt encouraged throughout the day to straighten, but meets with resistance. Poor appetite. BP maintained within range. Insulin given. Approved for [facility]. NPO at midnight. Kidney ultrasound with doppler in morning.

## 2023-02-16 NOTE — PROGRESS NOTES
Assumed care of pt at 730  A&Ox4. VSS. RA. NSR, on tele. L sided residual.  Ice pack q shift, changed. Lidocaine patch removed. Poor appetite, wife at bedside assisting with feeds. SBP goal = 110-150. NPO @ midnight. Monie approved, waiting for [facility]. of care to continue.

## 2023-02-16 NOTE — PLAN OF CARE
Assumed care of patient at midnight. Patient is A&Ox4. Vital signs stable. Afebrile. Room air, denies SOB. NSR on tele. SBP goal = 110-150. NPO at midnight for US Kidney. Patient denies N/V/D. Primofit/Brief in place. Left sided weakness/neglect. Patient c/o severe pain to left knee and left leg. PRN Morphine given per [facility], with mild relief. Ice packs applied PRN. Encouraged patient to stretch left leg. Right FA PIV - saline locked. Michael approved, awaiting medical clearance. Will continue the plan of care. Problem: NEUROLOGICAL - ADULT  Goal: Achieves stable or improved neurological status  Description: INTERVENTIONS  - Assess for and report changes in neurological status  - Initiate measures to prevent increased intracranial pressure  - Maintain blood pressure and fluid volume within ordered parameters to optimize cerebral perfusion and minimize risk of hemorrhage  - Monitor temperature, glucose, and sodium.  Initiate appropriate interventions as ordered  Outcome: Progressing  Goal: Achieves maximal functionality and self care  Description: INTERVENTIONS  - Monitor swallowing and airway patency with patient fatigue and changes in neurological status  - Encourage and assist patient to increase activity and self care with guidance from PT/OT  - Encourage visually impaired, hearing impaired and aphasic patients to use assistive/communication devices  Outcome: Progressing     Problem: PAIN - ADULT  Goal: Verbalizes/displays adequate comfort level or patient's stated pain goal  Description: INTERVENTIONS:  - Encourage pt to monitor pain and request assistance  - Assess pain using appropriate pain scale  - Administer analgesics based on type and severity of pain and evaluate response  - Implement non-pharmacological measures as appropriate and evaluate response  - Consider cultural and social influences on pain and pain management  - Manage/alleviate anxiety  - Utilize distraction and/or relaxation techniques  - Monitor for opioid side effects  - Notify MD/LIP if interventions unsuccessful or patient reports new pain  - Anticipate increased pain with activity and pre-medicate as appropriate  Outcome: Progressing

## 2023-02-16 NOTE — PLAN OF CARE
PT A&Ox4  RA;VSS  Tele- NSR/SB  NPO- medication held d/t having to take meds with applesauce. Jayce Bejaranoma aware. C/o pain in L Knee- see mar  US of kidney moved to tomorrow. Medications given. Staff will continue to monitor      Problem: NEUROLOGICAL - ADULT  Goal: Achieves stable or improved neurological status  Description: INTERVENTIONS  - Assess for and report changes in neurological status  - Initiate measures to prevent increased intracranial pressure  - Maintain blood pressure and fluid volume within ordered parameters to optimize cerebral perfusion and minimize risk of hemorrhage  - Monitor temperature, glucose, and sodium.  Initiate appropriate interventions as ordered  Outcome: Progressing  Goal: Achieves maximal functionality and self care  Description: INTERVENTIONS  - Monitor swallowing and airway patency with patient fatigue and changes in neurological status  - Encourage and assist patient to increase activity and self care with guidance from PT/OT  - Encourage visually impaired, hearing impaired and aphasic patients to use assistive/communication devices  Outcome: Progressing     Problem: PAIN - ADULT  Goal: Verbalizes/displays adequate comfort level or patient's stated pain goal  Description: INTERVENTIONS:  - Encourage pt to monitor pain and request assistance  - Assess pain using appropriate pain scale  - Administer analgesics based on type and severity of pain and evaluate response  - Implement non-pharmacological measures as appropriate and evaluate response  - Consider cultural and social influences on pain and pain management  - Manage/alleviate anxiety  - Utilize distraction and/or relaxation techniques  - Monitor for opioid side effects  - Notify MD/LIP if interventions unsuccessful or patient reports new pain  - Anticipate increased pain with activity and pre-medicate as appropriate  Outcome: Progressing

## 2023-02-16 NOTE — CM/SW NOTE
DC pending US Kidneys. Pt also got IV pain meds last night and [facility] requires 24 hours no iv. MSW await response if pt can dc later tonight if US Kidneys' are good.     Insurance auth in place

## 2023-02-16 NOTE — CONSULTS
[address]    PATIENT'S NAME: Ted Forbes   ATTENDING PHYSICIAN: Geraldo Regan MD   CONSULTING PHYSICIAN: Arlet Ward M.D. PATIENT ACCOUNT#:   [de-identified]    LOCATION:  [address] [place] [address]  MEDICAL RECORD #:   [id number]       YOB: 1960  ADMISSION DATE:       02/10/2023      CONSULT DATE:  02/15/2023    REPORT OF CONSULTATION    HISTORY OF PRESENT ILLNESS:  Patient is a 58-year-old gentleman who unfortunately had a stroke a week and a half ago. He was in the hospital for several days and then discharged. His wife, who is at the bedside, is able to give me a history as well. She says that before he was discharged from the hospital, he was able to go up and down the steps with assistance from therapy. They were readmitted because of increasing problems at home. We were consulted regarding the left knee and possible need for aspiration. PHYSICAL EXAMINATION:  He is lying in bed with the left hand appearing limp, and the left leg with visible spasm in the muscles throughout the leg and the leg externally rotated at the hip and flexed to approximately 150 degrees at the knee. He has a patch over the knee for local anesthetic. This was taken off and the knee inspected. With slow steady pressure, I was able to get him to a degree of extension where he was approximately 30 to 40 degrees short of full extension. With this, the knee was able to be examined more closely. He has some slight soft tissue swelling anteriorly, but this is nontense. There are no signs of infection. There is no effusion. It seems to be more soft tissue swelling anteriorly. The knee is not tense. Once releasing him, he stayed with it partially extended for a bit and then with further contracture of the muscle, he cruz it back up to well over 90 degrees of flexion. IMAGING:  X-rays reviewed show significant osteoarthritis tricompartmentally in the left knee. IMPRESSION:    1. Osteoarthritis. 2.   Muscle spasm. 3.   Soft tissue swelling. PLAN:  I have discussed with the wife that there is no need for aspiration at this time. He has some soft tissue swelling but no effusion, or if any, it certainly is not tense. Icing to the knee would be indicated. Physical therapy for avoiding contracture. I will see him back as needed.     Dictated By Terri Perez M.D.  d: 02/15/2023 14:09:07  t: 02/15/2023 15:13:01  [facility] [id number]  /

## 2023-02-16 NOTE — OCCUPATIONAL THERAPY NOTE
OCCUPATIONAL THERAPY                             OT treatment attempted. Per nurse the patient received Michelle Mchugh recently. Spouse was present and reported that patient had been fasting \"for 16 hours\" and had recently eaten. Patient declined therapy activity, wanting to rest. Will follow and re-attempt at a later date.

## 2023-02-17 ENCOUNTER — APPOINTMENT (OUTPATIENT)
Dept: ULTRASOUND IMAGING | Facility: HOSPITAL | Age: 63
DRG: 064 | End: 2023-02-17
Attending: HOSPITALIST
Payer: COMMERCIAL

## 2023-02-17 ENCOUNTER — APPOINTMENT (OUTPATIENT)
Dept: CT IMAGING | Facility: HOSPITAL | Age: 63
End: 2023-02-17
Attending: INTERNAL MEDICINE
Payer: COMMERCIAL

## 2023-02-17 ENCOUNTER — APPOINTMENT (OUTPATIENT)
Dept: CT IMAGING | Facility: HOSPITAL | Age: 63
DRG: 064 | End: 2023-02-17
Attending: INTERNAL MEDICINE
Payer: COMMERCIAL

## 2023-02-17 ENCOUNTER — APPOINTMENT (OUTPATIENT)
Dept: ULTRASOUND IMAGING | Facility: HOSPITAL | Age: 63
End: 2023-02-17
Attending: HOSPITALIST
Payer: COMMERCIAL

## 2023-02-17 LAB
ALBUMIN SERPL-MCNC: 3.3 G/DL (ref 3.4–5)
ALBUMIN/GLOB SERPL: 0.8 {RATIO} (ref 1–2)
ALP LIVER SERPL-CCNC: 112 U/L
ALT SERPL-CCNC: 48 U/L
ANION GAP SERPL CALC-SCNC: 4 MMOL/L (ref 0–18)
AST SERPL-CCNC: 41 U/L (ref 15–37)
BASOPHILS # BLD AUTO: 0.06 X10(3) UL (ref 0–0.2)
BASOPHILS NFR BLD AUTO: 0.6 %
BILIRUB SERPL-MCNC: 0.9 MG/DL (ref 0.1–2)
BUN BLD-MCNC: 40 MG/DL (ref 7–18)
CALCIUM BLD-MCNC: 9.3 MG/DL (ref 8.5–10.1)
CHLORIDE SERPL-SCNC: 111 MMOL/L (ref 98–112)
CO2 SERPL-SCNC: 26 MMOL/L (ref 21–32)
CREAT BLD-MCNC: 1.08 MG/DL
EOSINOPHIL # BLD AUTO: 0.01 X10(3) UL (ref 0–0.7)
EOSINOPHIL NFR BLD AUTO: 0.1 %
ERYTHROCYTE [DISTWIDTH] IN BLOOD BY AUTOMATED COUNT: 12.6 %
GFR SERPLBLD BASED ON 1.73 SQ M-ARVRAT: 77 ML/MIN/1.73M2 (ref 60–?)
GLOBULIN PLAS-MCNC: 3.9 G/DL (ref 2.8–4.4)
GLUCOSE BLD-MCNC: 180 MG/DL (ref 70–99)
GLUCOSE BLD-MCNC: 185 MG/DL (ref 70–99)
GLUCOSE BLD-MCNC: 198 MG/DL (ref 70–99)
GLUCOSE BLD-MCNC: 211 MG/DL (ref 70–99)
GLUCOSE BLD-MCNC: 220 MG/DL (ref 70–99)
GLUCOSE BLD-MCNC: 235 MG/DL (ref 70–99)
HCT VFR BLD AUTO: 53.2 %
HGB BLD-MCNC: 17.8 G/DL
IMM GRANULOCYTES # BLD AUTO: 0.05 X10(3) UL (ref 0–1)
IMM GRANULOCYTES NFR BLD: 0.5 %
LYMPHOCYTES # BLD AUTO: 1.21 X10(3) UL (ref 1–4)
LYMPHOCYTES NFR BLD AUTO: 11.2 %
MCH RBC QN AUTO: 30.5 PG (ref 26–34)
MCHC RBC AUTO-ENTMCNC: 33.5 G/DL (ref 31–37)
MCV RBC AUTO: 91.3 FL
MONOCYTES # BLD AUTO: 0.61 X10(3) UL (ref 0.1–1)
MONOCYTES NFR BLD AUTO: 5.6 %
NEUTROPHILS # BLD AUTO: 8.86 X10 (3) UL (ref 1.5–7.7)
NEUTROPHILS # BLD AUTO: 8.86 X10(3) UL (ref 1.5–7.7)
NEUTROPHILS NFR BLD AUTO: 82 %
OSMOLALITY SERPL CALC.SUM OF ELEC: 309 MOSM/KG (ref 275–295)
PLATELET # BLD AUTO: 236 10(3)UL (ref 150–450)
POTASSIUM SERPL-SCNC: 3.7 MMOL/L (ref 3.5–5.1)
PROT SERPL-MCNC: 7.2 G/DL (ref 6.4–8.2)
RBC # BLD AUTO: 5.83 X10(6)UL
SODIUM SERPL-SCNC: 141 MMOL/L (ref 136–145)
WBC # BLD AUTO: 10.8 X10(3) UL (ref 4–11)

## 2023-02-17 PROCEDURE — 99232 SBSQ HOSP IP/OBS MODERATE 35: CPT | Performed by: HOSPITALIST

## 2023-02-17 PROCEDURE — 76775 US EXAM ABDO BACK WALL LIM: CPT | Performed by: HOSPITALIST

## 2023-02-17 PROCEDURE — 93975 VASCULAR STUDY: CPT | Performed by: HOSPITALIST

## 2023-02-17 PROCEDURE — 70450 CT HEAD/BRAIN W/O DYE: CPT | Performed by: INTERNAL MEDICINE

## 2023-02-17 RX ORDER — SENNA AND DOCUSATE SODIUM 50; 8.6 MG/1; MG/1
1 TABLET, FILM COATED ORAL DAILY
Qty: 30 TABLET | Refills: 0 | Status: SHIPPED | OUTPATIENT
Start: 2023-02-17

## 2023-02-17 RX ORDER — MIRTAZAPINE 15 MG/1
15 TABLET, FILM COATED ORAL NIGHTLY
Status: DISCONTINUED | OUTPATIENT
Start: 2023-02-17 | End: 2023-02-24

## 2023-02-17 RX ORDER — HYDRALAZINE HYDROCHLORIDE 50 MG/1
50 TABLET, FILM COATED ORAL EVERY 8 HOURS SCHEDULED
Qty: 90 TABLET | Refills: 0 | Status: SHIPPED | OUTPATIENT
Start: 2023-02-17

## 2023-02-17 RX ORDER — SODIUM CHLORIDE 9 MG/ML
INJECTION, SOLUTION INTRAVENOUS CONTINUOUS
Status: DISCONTINUED | OUTPATIENT
Start: 2023-02-17 | End: 2023-02-20

## 2023-02-17 RX ORDER — MULTIPLE VITAMINS W/ MINERALS TAB 9MG-400MCG
1 TAB ORAL DAILY
Status: DISCONTINUED | OUTPATIENT
Start: 2023-02-18 | End: 2023-02-24

## 2023-02-17 RX ORDER — SENNA AND DOCUSATE SODIUM 50; 8.6 MG/1; MG/1
1 TABLET, FILM COATED ORAL DAILY
Status: DISCONTINUED | OUTPATIENT
Start: 2023-02-17 | End: 2023-02-24

## 2023-02-17 NOTE — PLAN OF CARE
Assumed care of pt at 730  A&Ox4. VSS. RA. NSR, on tele. L sided residual.  Ice pack q shift, changed. Lidocaine patch removed. SBP goal = 110-150. NPO @ midnight. Monie approved, still waiting for [facility]. of care to continue. Problem: NEUROLOGICAL - ADULT  Goal: Achieves stable or improved neurological status  Description: INTERVENTIONS  - Assess for and report changes in neurological status  - Initiate measures to prevent increased intracranial pressure  - Maintain blood pressure and fluid volume within ordered parameters to optimize cerebral perfusion and minimize risk of hemorrhage  - Monitor temperature, glucose, and sodium.  Initiate appropriate interventions as ordered  Outcome: Progressing  Goal: Achieves maximal functionality and self care  Description: INTERVENTIONS  - Monitor swallowing and airway patency with patient fatigue and changes in neurological status  - Encourage and assist patient to increase activity and self care with guidance from PT/OT  - Encourage visually impaired, hearing impaired and aphasic patients to use assistive/communication devices  Outcome: Progressing     Problem: PAIN - ADULT  Goal: Verbalizes/displays adequate comfort level or patient's stated pain goal  Description: INTERVENTIONS:  - Encourage pt to monitor pain and request assistance  - Assess pain using appropriate pain scale  - Administer analgesics based on type and severity of pain and evaluate response  - Implement non-pharmacological measures as appropriate and evaluate response  - Consider cultural and social influences on pain and pain management  - Manage/alleviate anxiety  - Utilize distraction and/or relaxation techniques  - Monitor for opioid side effects  - Notify MD/LIP if interventions unsuccessful or patient reports new pain  - Anticipate increased pain with activity and pre-medicate as appropriate  Outcome: Progressing

## 2023-02-17 NOTE — CM/SW NOTE
12  MSW spoke to [facility] admissions about possible dc.  would like Covid test and new labs-MSW messaged RN.    2:20pm  MSW asked Rn/MD for calorie count per  request. [facility] does not want to accept until pt has good oral intake.   Labs sent to  by MSW     Barrier:  Calorie count  Send  updated covid

## 2023-02-17 NOTE — CM/SW NOTE
30-Day READMISSION Note  From 2/6 to 2/9/2023 patient presented to the ED with an acute ischemis stroke. Patient found to have slurred speech and facial droop. Patient noted to have uncontrolled HTN and DM. Seen by therapies, recommendation of Day Rehab. Contact information to reach out for discharge planning provided. Patient discharged to home   LACE score 69. On 2/10/2023 patient brought back to ED--family unable to care for patient. No reassessment completed. Home health not set up as a 'bridge' to establishment of Day Rehab.   Patient evaluated--acute rehab recommendation--insurance auth obtained--to [facility]

## 2023-02-17 NOTE — PLAN OF CARE
Pt A&Ox4  RA;VSS  Tele- NSR  C/o knee pain- see mar  Very poor appetite  QID accucheck  US kidneys neg   PIV SL   Staff will continue to monitor       Problem: NEUROLOGICAL - ADULT  Goal: Achieves stable or improved neurological status  Description: INTERVENTIONS  - Assess for and report changes in neurological status  - Initiate measures to prevent increased intracranial pressure  - Maintain blood pressure and fluid volume within ordered parameters to optimize cerebral perfusion and minimize risk of hemorrhage  - Monitor temperature, glucose, and sodium.  Initiate appropriate interventions as ordered  Outcome: Progressing  Goal: Achieves maximal functionality and self care  Description: INTERVENTIONS  - Monitor swallowing and airway patency with patient fatigue and changes in neurological status  - Encourage and assist patient to increase activity and self care with guidance from PT/OT  - Encourage visually impaired, hearing impaired and aphasic patients to use assistive/communication devices  Outcome: Progressing     Problem: PAIN - ADULT  Goal: Verbalizes/displays adequate comfort level or patient's stated pain goal  Description: INTERVENTIONS:  - Encourage pt to monitor pain and request assistance  - Assess pain using appropriate pain scale  - Administer analgesics based on type and severity of pain and evaluate response  - Implement non-pharmacological measures as appropriate and evaluate response  - Consider cultural and social influences on pain and pain management  - Manage/alleviate anxiety  - Utilize distraction and/or relaxation techniques  - Monitor for opioid side effects  - Notify MD/LIP if interventions unsuccessful or patient reports new pain  - Anticipate increased pain with activity and pre-medicate as appropriate  Outcome: Progressing

## 2023-02-18 ENCOUNTER — APPOINTMENT (OUTPATIENT)
Dept: GENERAL RADIOLOGY | Facility: HOSPITAL | Age: 63
End: 2023-02-18
Attending: HOSPITALIST
Payer: COMMERCIAL

## 2023-02-18 ENCOUNTER — APPOINTMENT (OUTPATIENT)
Dept: GENERAL RADIOLOGY | Facility: HOSPITAL | Age: 63
DRG: 064 | End: 2023-02-18
Attending: HOSPITALIST
Payer: COMMERCIAL

## 2023-02-18 LAB
GLUCOSE BLD-MCNC: 164 MG/DL (ref 70–99)
GLUCOSE BLD-MCNC: 172 MG/DL (ref 70–99)
GLUCOSE BLD-MCNC: 177 MG/DL (ref 70–99)
GLUCOSE BLD-MCNC: 301 MG/DL (ref 70–99)

## 2023-02-18 PROCEDURE — 71045 X-RAY EXAM CHEST 1 VIEW: CPT | Performed by: HOSPITALIST

## 2023-02-18 PROCEDURE — 99233 SBSQ HOSP IP/OBS HIGH 50: CPT | Performed by: OTHER

## 2023-02-18 PROCEDURE — 99233 SBSQ HOSP IP/OBS HIGH 50: CPT | Performed by: HOSPITALIST

## 2023-02-18 NOTE — DIETARY NOTE
Nutrition Note:     Consult to initiate tube feedings. Pt is currently NPO. Pt with full assessment on 2/17. TF recommendations: Start Vital HP at 30ml/hr and advance by 10ml/hr q 6hrs to goal of 80ml/hr. Water flushes of 100ml q 6hrs. This will provide 1920kcal/day, 168g protein/day, and 2012ml of free water. RD to follow up for tolerance on 2/20.       [phone] [facility]

## 2023-02-18 NOTE — PLAN OF CARE
Dr. Regla Hensley returned phone call. He was updated and stated he would be around to see pt. Later today.

## 2023-02-18 NOTE — PROGRESS NOTES
Assumed care at 7:30am.   Easily woken up, responding to yes or no questions well. Following commands. Took medications PO with water. Still drowsy. IV fluids running. C/o pain on left leg, lidocaine patch applied and scheduled pain medications given. RA  Tele on. Spoke with wife, she was concerned over last night patient difficult to wake up. Also asking for CT results. Will ask MD to talk to family about them. All safety precautions in place. Will continue to monitor patient.

## 2023-02-18 NOTE — PLAN OF CARE
Pt. Remains drowsy, but easier to arouse. Alert and oriented x4. Follows most commands. Left sided remains weak. Denies any pain. Consulted Dr. Johnna Epley from Neurology. Pt. Rested well thoughout the night.

## 2023-02-18 NOTE — PHYSICAL THERAPY NOTE
Attempted to see Pt this AM - RN aware of attempt. Pt eating a delicious piece of pizza - noted in chart Pt working towards calorie count. Will f/u later today if time permits, after all other patients are attempted per tentative schedule.

## 2023-02-18 NOTE — PLAN OF CARE
Received pt. In bed on room air. Did not eat much of his dinner tray. Calorie count ongoing. Left leg with Lidoderm patch on it. Wife and daughter at bedside. They felt he was more lethargic. He did arouse with slight sternal rub and calling his name. Difficult to evaluate. Notified Dr. Opal Porras and orders received. Pt. Lona Cortez to CT scan and results sent to Dr. Aubrey Simental. No new orders. Primofit on. Left leg kept straight. IVF's 0.9 at 100 an hour. Resting fairly well. Family left for the night.

## 2023-02-19 LAB
ALDOSTERONE/RENIN ACTIVITY RATIO: 5.2 RATIO
ALDOSTERONE: 7.3 NG/DL
GLUCOSE BLD-MCNC: 182 MG/DL (ref 70–99)
GLUCOSE BLD-MCNC: 227 MG/DL (ref 70–99)
GLUCOSE BLD-MCNC: 239 MG/DL (ref 70–99)
GLUCOSE BLD-MCNC: 240 MG/DL (ref 70–99)
RENIN ACTIVITY: 1.4 NG/ML/HR

## 2023-02-19 PROCEDURE — 99232 SBSQ HOSP IP/OBS MODERATE 35: CPT | Performed by: HOSPITALIST

## 2023-02-19 NOTE — PROGRESS NOTES
Assumed care at 7:30am  States he has mild pain on his left knee, lidocaine patch applied. Tylenol given. Alert and oriented x4. Still drowsy but more awake than yesterday. Took all medications by mouth. Tele on  O2 on. All safety precautions in place.  Will continue to monitor patient

## 2023-02-19 NOTE — PROGRESS NOTES
Assumed care of patient at 1. Pt took all medications per MAR crushed in applesauce. TF running at 50 ml/hr, advanced per orders, pt tolerating. IVF running per orders. C/o pain to left knee, given tylenol and applied ice. Stable on room air saturating 95%. Tele NSR. Safety precautions in place.

## 2023-02-20 ENCOUNTER — ANESTHESIA EVENT (OUTPATIENT)
Dept: ENDOSCOPY | Facility: HOSPITAL | Age: 63
End: 2023-02-20
Payer: COMMERCIAL

## 2023-02-20 DIAGNOSIS — R63.30 FEEDING DIFFICULTIES: Primary | ICD-10-CM

## 2023-02-20 LAB
GLUCOSE BLD-MCNC: 208 MG/DL (ref 70–99)
GLUCOSE BLD-MCNC: 221 MG/DL (ref 70–99)
GLUCOSE BLD-MCNC: 221 MG/DL (ref 70–99)
GLUCOSE BLD-MCNC: 229 MG/DL (ref 70–99)
SARS-COV-2 RNA RESP QL NAA+PROBE: NOT DETECTED

## 2023-02-20 PROCEDURE — 99233 SBSQ HOSP IP/OBS HIGH 50: CPT | Performed by: HOSPITALIST

## 2023-02-20 RX ORDER — HYDROCODONE BITARTRATE AND ACETAMINOPHEN 5; 325 MG/1; MG/1
1 TABLET ORAL EVERY 4 HOURS PRN
Status: DISCONTINUED | OUTPATIENT
Start: 2023-02-20 | End: 2023-02-24

## 2023-02-20 RX ORDER — CEFAZOLIN SODIUM/WATER 2 G/20 ML
2 SYRINGE (ML) INTRAVENOUS
Status: COMPLETED | OUTPATIENT
Start: 2023-02-21 | End: 2023-02-21

## 2023-02-20 NOTE — PLAN OF CARE
Assume care @ 0730  AO X3, forgetful but Verbally responsive. On Ra  NSR on Tele. Left side flaccid with hx of stroke. C/o of pain to left knee. Lidocaine patch applied. Incontinent, primofit on . Max assist.   Tube feed  on Glucerna @ 70ml/hr. H20 100ml q4h  Refused all meals. Encouraged multiple times but refused. Poor appetite meals. Able to take sips of water and ice tea. Aspiration precaution. Lovenox subQ  Wife at bedside. QID accu check. Insulin as ordered. PEG placement tomorrow. Consent signed by wife and placed in chart. NPO at midnight. Q2 hr repositioning. Fall and safety precautions in place. Problem: NEUROLOGICAL - ADULT  Goal: Achieves stable or improved neurological status  Description: INTERVENTIONS  - Assess for and report changes in neurological status  - Initiate measures to prevent increased intracranial pressure  - Maintain blood pressure and fluid volume within ordered parameters to optimize cerebral perfusion and minimize risk of hemorrhage  - Monitor temperature, glucose, and sodium.  Initiate appropriate interventions as ordered  Outcome: Progressing  Goal: Achieves maximal functionality and self care  Description: INTERVENTIONS  - Monitor swallowing and airway patency with patient fatigue and changes in neurological status  - Encourage and assist patient to increase activity and self care with guidance from PT/OT  - Encourage visually impaired, hearing impaired and aphasic patients to use assistive/communication devices  Outcome: Progressing     Problem: PAIN - ADULT  Goal: Verbalizes/displays adequate comfort level or patient's stated pain goal  Description: INTERVENTIONS:  - Encourage pt to monitor pain and request assistance  - Assess pain using appropriate pain scale  - Administer analgesics based on type and severity of pain and evaluate response  - Implement non-pharmacological measures as appropriate and evaluate response  - Consider cultural and social influences on pain and pain management  - Manage/alleviate anxiety  - Utilize distraction and/or relaxation techniques  - Monitor for opioid side effects  - Notify MD/LIP if interventions unsuccessful or patient reports new pain  - Anticipate increased pain with activity and pre-medicate as appropriate  Outcome: Progressing     Problem: Delirium  Goal: Minimize duration of delirium  Description: Interventions:  - Encourage use of hearing aids, eye glasses  - Promote highest level of mobility daily  - Provide frequent reorientation  - Promote wakefulness i.e. lights on, blinds open  - Promote sleep, encourage patient's normal rest cycle i.e. lights off, TV off, minimize noise and interruptions  - Encourage family to assist in orientation and promotion of home routines  Outcome: Progressing

## 2023-02-20 NOTE — PLAN OF CARE
A&Ox4. On room air, lungs clear and diminished. Abdomen soft and nontender, Denies N/V/D. TF via NG tube- rate is at 70 mL/hr- to increase to goal of 80 mL/hr at 0100. Patient is tolerating. Left side weakness- pillow support, turned and repositioned. Denies pain to left knee at this time. lidocaine patch removed, ice packs applied. 0.9 NS infusing at 83 mL/hr to right forearm PIV. Briefed and primofit in place. NSR on tele. Plan is for patient to discharge to [facility] when medically cleared. Needs met. Will monitor. Problem: NEUROLOGICAL - ADULT  Goal: Achieves stable or improved neurological status  Description: INTERVENTIONS  - Assess for and report changes in neurological status  - Initiate measures to prevent increased intracranial pressure  - Maintain blood pressure and fluid volume within ordered parameters to optimize cerebral perfusion and minimize risk of hemorrhage  - Monitor temperature, glucose, and sodium.  Initiate appropriate interventions as ordered  Outcome: Progressing  Goal: Achieves maximal functionality and self care  Description: INTERVENTIONS  - Monitor swallowing and airway patency with patient fatigue and changes in neurological status  - Encourage and assist patient to increase activity and self care with guidance from PT/OT  - Encourage visually impaired, hearing impaired and aphasic patients to use assistive/communication devices  Outcome: Progressing     Problem: PAIN - ADULT  Goal: Verbalizes/displays adequate comfort level or patient's stated pain goal  Description: INTERVENTIONS:  - Encourage pt to monitor pain and request assistance  - Assess pain using appropriate pain scale  - Administer analgesics based on type and severity of pain and evaluate response  - Implement non-pharmacological measures as appropriate and evaluate response  - Consider cultural and social influences on pain and pain management  - Manage/alleviate anxiety  - Utilize distraction and/or relaxation techniques  - Monitor for opioid side effects  - Notify MD/LIP if interventions unsuccessful or patient reports new pain  - Anticipate increased pain with activity and pre-medicate as appropriate  Outcome: Progressing     Problem: Delirium  Goal: Minimize duration of delirium  Description: Interventions:  - Encourage use of hearing aids, eye glasses  - Promote highest level of mobility daily  - Provide frequent reorientation  - Promote wakefulness i.e. lights on, blinds open  - Promote sleep, encourage patient's normal rest cycle i.e. lights off, TV off, minimize noise and interruptions  - Encourage family to assist in orientation and promotion of home routines  Outcome: Progressing

## 2023-02-21 ENCOUNTER — APPOINTMENT (OUTPATIENT)
Dept: GENERAL RADIOLOGY | Facility: HOSPITAL | Age: 63
DRG: 064 | End: 2023-02-21
Attending: HOSPITALIST
Payer: COMMERCIAL

## 2023-02-21 ENCOUNTER — APPOINTMENT (OUTPATIENT)
Dept: GENERAL RADIOLOGY | Facility: HOSPITAL | Age: 63
End: 2023-02-21
Attending: HOSPITALIST
Payer: COMMERCIAL

## 2023-02-21 ENCOUNTER — ANESTHESIA (OUTPATIENT)
Dept: ENDOSCOPY | Facility: HOSPITAL | Age: 63
End: 2023-02-21
Payer: COMMERCIAL

## 2023-02-21 LAB
ANION GAP SERPL CALC-SCNC: 2 MMOL/L (ref 0–18)
ARTERIAL PATENCY WRIST A: POSITIVE
BASE EXCESS BLDA CALC-SCNC: 6.1 MMOL/L (ref ?–2)
BASOPHILS # BLD AUTO: 0.07 X10(3) UL (ref 0–0.2)
BASOPHILS NFR BLD AUTO: 0.5 %
BODY TEMPERATURE: 98.6 F
BUN BLD-MCNC: 23 MG/DL (ref 7–18)
CA-I BLD-SCNC: 1.22 MMOL/L (ref 0.95–1.32)
CALCIUM BLD-MCNC: 8.9 MG/DL (ref 8.5–10.1)
CHLORIDE SERPL-SCNC: 115 MMOL/L (ref 98–112)
CO2 SERPL-SCNC: 27 MMOL/L (ref 21–32)
COHGB MFR BLD: 2 % SAT (ref 0–3)
CREAT BLD-MCNC: 0.8 MG/DL
EOSINOPHIL # BLD AUTO: 0.23 X10(3) UL (ref 0–0.7)
EOSINOPHIL NFR BLD AUTO: 1.6 %
ERYTHROCYTE [DISTWIDTH] IN BLOOD BY AUTOMATED COUNT: 12.4 %
GFR SERPLBLD BASED ON 1.73 SQ M-ARVRAT: 99 ML/MIN/1.73M2 (ref 60–?)
GLUCOSE BLD-MCNC: 171 MG/DL (ref 70–99)
GLUCOSE BLD-MCNC: 183 MG/DL (ref 70–99)
GLUCOSE BLD-MCNC: 200 MG/DL (ref 70–99)
GLUCOSE BLD-MCNC: 206 MG/DL (ref 70–99)
HCO3 BLDA-SCNC: 29.6 MEQ/L (ref 21–27)
HCT VFR BLD AUTO: 50.2 %
HGB BLD-MCNC: 16.4 G/DL
HGB BLD-MCNC: 16.4 G/DL
IMM GRANULOCYTES # BLD AUTO: 0.07 X10(3) UL (ref 0–1)
IMM GRANULOCYTES NFR BLD: 0.5 %
INR BLD: 1.12 (ref 0.85–1.16)
LACTATE BLD-SCNC: 0.8 MMOL/L (ref 0.5–2)
LYMPHOCYTES # BLD AUTO: 1.64 X10(3) UL (ref 1–4)
LYMPHOCYTES NFR BLD AUTO: 11.4 %
MAGNESIUM SERPL-MCNC: 2.1 MG/DL (ref 1.6–2.6)
MCH RBC QN AUTO: 30.3 PG (ref 26–34)
MCHC RBC AUTO-ENTMCNC: 32.7 G/DL (ref 31–37)
MCV RBC AUTO: 92.6 FL
METHGB MFR BLD: 0.7 % SAT (ref 0.4–1.5)
MONOCYTES # BLD AUTO: 0.96 X10(3) UL (ref 0.1–1)
MONOCYTES NFR BLD AUTO: 6.7 %
NEUTROPHILS # BLD AUTO: 11.43 X10 (3) UL (ref 1.5–7.7)
NEUTROPHILS # BLD AUTO: 11.43 X10(3) UL (ref 1.5–7.7)
NEUTROPHILS NFR BLD AUTO: 79.3 %
OSMOLALITY SERPL CALC.SUM OF ELEC: 308 MOSM/KG (ref 275–295)
OXYHGB MFR BLDA: 94.9 % (ref 92–100)
PCO2 BLDA: 38 MM HG (ref 35–45)
PH BLDA: 7.5 [PH] (ref 7.35–7.45)
PLATELET # BLD AUTO: 169 10(3)UL (ref 150–450)
PO2 BLDA: 79 MM HG (ref 80–100)
POTASSIUM BLD-SCNC: 3.9 MMOL/L (ref 3.6–5.1)
POTASSIUM SERPL-SCNC: 3.7 MMOL/L (ref 3.5–5.1)
PROCALCITONIN SERPL-MCNC: <0.05 NG/ML (ref ?–0.16)
PROTHROMBIN TIME: 14.4 SECONDS (ref 11.6–14.8)
RBC # BLD AUTO: 5.42 X10(6)UL
SODIUM BLD-SCNC: 140 MMOL/L (ref 135–145)
SODIUM SERPL-SCNC: 144 MMOL/L (ref 136–145)
WBC # BLD AUTO: 14.4 X10(3) UL (ref 4–11)

## 2023-02-21 PROCEDURE — 0DH63UZ INSERTION OF FEEDING DEVICE INTO STOMACH, PERCUTANEOUS APPROACH: ICD-10-PCS | Performed by: STUDENT IN AN ORGANIZED HEALTH CARE EDUCATION/TRAINING PROGRAM

## 2023-02-21 PROCEDURE — 71045 X-RAY EXAM CHEST 1 VIEW: CPT | Performed by: HOSPITALIST

## 2023-02-21 PROCEDURE — 99233 SBSQ HOSP IP/OBS HIGH 50: CPT | Performed by: HOSPITALIST

## 2023-02-21 RX ORDER — SODIUM CHLORIDE, SODIUM LACTATE, POTASSIUM CHLORIDE, CALCIUM CHLORIDE 600; 310; 30; 20 MG/100ML; MG/100ML; MG/100ML; MG/100ML
INJECTION, SOLUTION INTRAVENOUS CONTINUOUS PRN
Status: DISCONTINUED | OUTPATIENT
Start: 2023-02-21 | End: 2023-02-21 | Stop reason: SURG

## 2023-02-21 RX ADMIN — CEFAZOLIN SODIUM/WATER 2 G: 2 G/20 ML SYRINGE (ML) INTRAVENOUS at 10:07:00

## 2023-02-21 RX ADMIN — SODIUM CHLORIDE, SODIUM LACTATE, POTASSIUM CHLORIDE, CALCIUM CHLORIDE: 600; 310; 30; 20 INJECTION, SOLUTION INTRAVENOUS at 10:08:00

## 2023-02-21 NOTE — ANESTHESIA POSTPROCEDURE EVALUATION
[address] [place] [address] Patient Status:  Inpatient   Age/Gender 57year old male MRN [id number]   Location [address] [address] Attending Ismael Tamanna, [address] [facility] Day # 8 PCP None Pcp       Anesthesia Post-op Note    PERCUTANEOUS ENDOSCOPIC GASTROSTOMY PLACEMENT TUBE PLACEMENT    Procedure Summary     Date: 02/21/23 Room / Location: [facility] ENDOSCOPY 02 / [facility] ENDOSCOPY    Anesthesia Start: 1000 Anesthesia Stop: 6309    Procedure: PERCUTANEOUS ENDOSCOPIC GASTROSTOMY PLACEMENT TUBE PLACEMENT Diagnosis:       Feeding difficulties      (gastric erosions, successful PEG placement)    Surgeons: Fei Foote MD Anesthesiologist: Saud Orellana MD    Anesthesia Type: MAC ASA Status: 3          Anesthesia Type: MAC    Vitals Value Taken Time   BP 12/72 02/21/23 1032   Temp avail 02/21/23 1032   Pulse 85 02/21/23 1032   Resp 19 02/21/23 1032   SpO2 99 02/21/23 1032       Patient Location: Endoscopy    Anesthesia Type: MAC    Airway Patency: patent    Postop Pain Control: adequate    Mental Status: preanesthetic baseline    Nausea/Vomiting: none    Cardiopulmonary/Hydration status: stable euvolemic    Complications: no apparent anesthesia related complications    Postop vital signs: stable    Dental Exam: Unchanged from Preop    Patient to be discharged from PACU when criteria met.

## 2023-02-21 NOTE — PHYSICAL THERAPY NOTE
IP PT attempted, pt to have PEG placed this AM, will re-attempt as schedule permits. Addendum: 12:19 f/u c RN s/p pt EGD  c PEG placement. Per RN hold therapy until tomorrow. Will re-attempt as schedule permits.

## 2023-02-21 NOTE — PLAN OF CARE
RN SHIFT NOTE    Assumed care of pt at . Pt denies pain at this time. Patient is alert and oriented x 4, but can be slow to respond (Reminders and reorientation completed). Patient is NSR on tele, S1 and S2 present and pt denies cardiac symptoms. Lung sounds clear and diminished . Abdomen soft and round. . Last BM on 2/19. PEG tube placement site C/D/I. Tolerating medications and care neds have been met. POC: Resume tube feedings tomorrow, awaiting acute rehab at [facility].

## 2023-02-21 NOTE — PLAN OF CARE
A&Ox4. On room air, lungs clear and diminished. Abdomen soft and nontender, Denies N/V/D. Glucerna 1.2 TF via NG tube- rate is at 70 mL/hr. Patient is tolerating. Plan to hold TF at midnight for PEG placement tomorrow. Lovenox on hold. Left side weakness- pillow support, turned and repositioned. Denies pain to left knee at this time. lidocaine patch removed, ice packs applied. Right forearm PIV saline locked. Briefed and primofit in place. NSR on tele. Needs met. Will monitor. 6739: NG tube migrated out while this writer was administering medication via NG tube. Coughing noted by patient. MD notified- STAT chest xray ordered for possible aspiration. Problem: NEUROLOGICAL - ADULT  Goal: Achieves stable or improved neurological status  Description: INTERVENTIONS  - Assess for and report changes in neurological status  - Initiate measures to prevent increased intracranial pressure  - Maintain blood pressure and fluid volume within ordered parameters to optimize cerebral perfusion and minimize risk of hemorrhage  - Monitor temperature, glucose, and sodium.  Initiate appropriate interventions as ordered  Outcome: Progressing  Goal: Achieves maximal functionality and self care  Description: INTERVENTIONS  - Monitor swallowing and airway patency with patient fatigue and changes in neurological status  - Encourage and assist patient to increase activity and self care with guidance from PT/OT  - Encourage visually impaired, hearing impaired and aphasic patients to use assistive/communication devices  Outcome: Progressing     Problem: PAIN - ADULT  Goal: Verbalizes/displays adequate comfort level or patient's stated pain goal  Description: INTERVENTIONS:  - Encourage pt to monitor pain and request assistance  - Assess pain using appropriate pain scale  - Administer analgesics based on type and severity of pain and evaluate response  - Implement non-pharmacological measures as appropriate and evaluate response  - Consider cultural and social influences on pain and pain management  - Manage/alleviate anxiety  - Utilize distraction and/or relaxation techniques  - Monitor for opioid side effects  - Notify MD/LIP if interventions unsuccessful or patient reports new pain  - Anticipate increased pain with activity and pre-medicate as appropriate  Outcome: Progressing     Problem: Delirium  Goal: Minimize duration of delirium  Description: Interventions:  - Encourage use of hearing aids, eye glasses  - Promote highest level of mobility daily  - Provide frequent reorientation  - Promote wakefulness i.e. lights on, blinds open  - Promote sleep, encourage patient's normal rest cycle i.e. lights off, TV off, minimize noise and interruptions  - Encourage family to assist in orientation and promotion of home routines  Outcome: Progressing

## 2023-02-22 LAB
BASOPHILS # BLD AUTO: 0.07 X10(3) UL (ref 0–0.2)
BASOPHILS NFR BLD AUTO: 0.6 %
EOSINOPHIL # BLD AUTO: 0.34 X10(3) UL (ref 0–0.7)
EOSINOPHIL NFR BLD AUTO: 2.7 %
ERYTHROCYTE [DISTWIDTH] IN BLOOD BY AUTOMATED COUNT: 12.1 %
GLUCOSE BLD-MCNC: 118 MG/DL (ref 70–99)
GLUCOSE BLD-MCNC: 129 MG/DL (ref 70–99)
GLUCOSE BLD-MCNC: 140 MG/DL (ref 70–99)
GLUCOSE BLD-MCNC: 177 MG/DL (ref 70–99)
HCT VFR BLD AUTO: 48.5 %
HGB BLD-MCNC: 15.9 G/DL
IMM GRANULOCYTES # BLD AUTO: 0.04 X10(3) UL (ref 0–1)
IMM GRANULOCYTES NFR BLD: 0.3 %
LYMPHOCYTES # BLD AUTO: 1.62 X10(3) UL (ref 1–4)
LYMPHOCYTES NFR BLD AUTO: 12.9 %
MCH RBC QN AUTO: 30.3 PG (ref 26–34)
MCHC RBC AUTO-ENTMCNC: 32.8 G/DL (ref 31–37)
MCV RBC AUTO: 92.4 FL
MONOCYTES # BLD AUTO: 0.71 X10(3) UL (ref 0.1–1)
MONOCYTES NFR BLD AUTO: 5.6 %
NEUTROPHILS # BLD AUTO: 9.8 X10 (3) UL (ref 1.5–7.7)
NEUTROPHILS # BLD AUTO: 9.8 X10(3) UL (ref 1.5–7.7)
NEUTROPHILS NFR BLD AUTO: 77.9 %
PLATELET # BLD AUTO: 177 10(3)UL (ref 150–450)
RBC # BLD AUTO: 5.25 X10(6)UL
WBC # BLD AUTO: 12.6 X10(3) UL (ref 4–11)

## 2023-02-22 PROCEDURE — 99232 SBSQ HOSP IP/OBS MODERATE 35: CPT | Performed by: INTERNAL MEDICINE

## 2023-02-22 PROCEDURE — 3E0G76Z INTRODUCTION OF NUTRITIONAL SUBSTANCE INTO UPPER GI, VIA NATURAL OR ARTIFICIAL OPENING: ICD-10-PCS | Performed by: INTERNAL MEDICINE

## 2023-02-22 RX ORDER — PANTOPRAZOLE SODIUM 40 MG/1
40 TABLET, DELAYED RELEASE ORAL
Status: DISCONTINUED | OUTPATIENT
Start: 2023-02-23 | End: 2023-02-24

## 2023-02-22 NOTE — PROGRESS NOTES
Pt Aox4, NSR, room air. Left arm flaccid and elevated on pillow. Pt had PEG placed this morning. Tube feedings to start tomorrow morning. Pt denies pain. Scheduled Tylenol. Oral care and bed bath given. Primo fit in place.

## 2023-02-22 NOTE — PLAN OF CARE
Assumed care at [address] [facility]. A/O x3. RA. NSR on tele. Denies pain. Briefed- primofit in place. L sided weakness- L arm elevated on pillow. LUQ PEG tube site CDI. Accuchecks q6h. NPO. TF to resume in the morning. Plan to DC to acute rehab. Safety prec in place. Needs being met at this time. Problem: NEUROLOGICAL - ADULT  Goal: Achieves stable or improved neurological status  Description: INTERVENTIONS  - Assess for and report changes in neurological status  - Initiate measures to prevent increased intracranial pressure  - Maintain blood pressure and fluid volume within ordered parameters to optimize cerebral perfusion and minimize risk of hemorrhage  - Monitor temperature, glucose, and sodium.  Initiate appropriate interventions as ordered  Outcome: Progressing  Goal: Achieves maximal functionality and self care  Description: INTERVENTIONS  - Monitor swallowing and airway patency with patient fatigue and changes in neurological status  - Encourage and assist patient to increase activity and self care with guidance from PT/OT  - Encourage visually impaired, hearing impaired and aphasic patients to use assistive/communication devices  Outcome: Progressing     Problem: PAIN - ADULT  Goal: Verbalizes/displays adequate comfort level or patient's stated pain goal  Description: INTERVENTIONS:  - Encourage pt to monitor pain and request assistance  - Assess pain using appropriate pain scale  - Administer analgesics based on type and severity of pain and evaluate response  - Implement non-pharmacological measures as appropriate and evaluate response  - Consider cultural and social influences on pain and pain management  - Manage/alleviate anxiety  - Utilize distraction and/or relaxation techniques  - Monitor for opioid side effects  - Notify MD/LIP if interventions unsuccessful or patient reports new pain  - Anticipate increased pain with activity and pre-medicate as appropriate  Outcome: Progressing     Problem: Delirium  Goal: Minimize duration of delirium  Description: Interventions:  - Encourage use of hearing aids, eye glasses  - Promote highest level of mobility daily  - Provide frequent reorientation  - Promote wakefulness i.e. lights on, blinds open  - Promote sleep, encourage patient's normal rest cycle i.e. lights off, TV off, minimize noise and interruptions  - Encourage family to assist in orientation and promotion of home routines  Outcome: Progressing

## 2023-02-22 NOTE — CM/SW NOTE
Per chart review pt has new peg. Pt will need to be re-evaluated by PT and possible new PMR. 11:34am  MSW updated that PMR will re-eval pt once he is tolerating tube feeds.  MJ can take a pt with peg

## 2023-02-22 NOTE — PROGRESS NOTES
[facility] Note: Route Optimization for Pantoprazole (PROTONIX)    Patient is currently on Pantoprazole (PROTONIX) 40 mg IV every 24 hours. The patient meets the criteria to convert to the oral equivalent as established by the IV to Oral conversion protocol approved by the P&T committee. Medication was changed from IV formulation to Pantoprazole (PROTONIX) 40 mg PO every 24 hours per protocol.       Jose MendezD  2/22/2023,  2:10 PM

## 2023-02-23 LAB
GLUCOSE BLD-MCNC: 170 MG/DL (ref 70–99)
GLUCOSE BLD-MCNC: 181 MG/DL (ref 70–99)
GLUCOSE BLD-MCNC: 207 MG/DL (ref 70–99)
GLUCOSE BLD-MCNC: 215 MG/DL (ref 70–99)
GLUCOSE BLD-MCNC: 235 MG/DL (ref 70–99)

## 2023-02-23 PROCEDURE — 99232 SBSQ HOSP IP/OBS MODERATE 35: CPT | Performed by: INTERNAL MEDICINE

## 2023-02-23 NOTE — PLAN OF CARE
Pt VSS, NSR, room air. Tube feedings started, abdominal binder placed. Meds crushed through given through Gtube except meds that cannot be crushed, given to patient one at a time, tolerated well. Pt encouraged to take some oral intake, puree diet, thin liquids. Mouth cleaned and swabbed, needs cleaning throughout shift. Patient sat at edge of bed with physical therapy. 3 bowel movements after TF started. No complaints of pain today. Problem: NEUROLOGICAL - ADULT  Goal: Achieves stable or improved neurological status  Description: INTERVENTIONS  - Assess for and report changes in neurological status  - Initiate measures to prevent increased intracranial pressure  - Maintain blood pressure and fluid volume within ordered parameters to optimize cerebral perfusion and minimize risk of hemorrhage  - Monitor temperature, glucose, and sodium.  Initiate appropriate interventions as ordered  Outcome: Progressing  Goal: Achieves maximal functionality and self care  Description: INTERVENTIONS  - Monitor swallowing and airway patency with patient fatigue and changes in neurological status  - Encourage and assist patient to increase activity and self care with guidance from PT/OT  - Encourage visually impaired, hearing impaired and aphasic patients to use assistive/communication devices  Outcome: Progressing

## 2023-02-23 NOTE — PLAN OF CARE
Assumed care at [facility]. A/O x3. RA. NSR on tele. Denies pain. Briefed- primofit in place. L sided weakness- L arm elevated on pillow. LUQ PEG tube site CDI. Accuchecks q6h. NPO. TF Glucerna 1.2- infusing at 70 ml/hr with 100 ml water flush q4h. Plan to DC to acute rehab. Safety prec in place. Needs being met at this time. Problem: NEUROLOGICAL - ADULT  Goal: Achieves stable or improved neurological status  Description: INTERVENTIONS  - Assess for and report changes in neurological status  - Initiate measures to prevent increased intracranial pressure  - Maintain blood pressure and fluid volume within ordered parameters to optimize cerebral perfusion and minimize risk of hemorrhage  - Monitor temperature, glucose, and sodium.  Initiate appropriate interventions as ordered  Outcome: Progressing     Problem: NEUROLOGICAL - ADULT  Goal: Achieves maximal functionality and self care  Description: INTERVENTIONS  - Monitor swallowing and airway patency with patient fatigue and changes in neurological status  - Encourage and assist patient to increase activity and self care with guidance from PT/OT  - Encourage visually impaired, hearing impaired and aphasic patients to use assistive/communication devices  Outcome: Progressing     Problem: PAIN - ADULT  Goal: Verbalizes/displays adequate comfort level or patient's stated pain goal  Description: INTERVENTIONS:  - Encourage pt to monitor pain and request assistance  - Assess pain using appropriate pain scale  - Administer analgesics based on type and severity of pain and evaluate response  - Implement non-pharmacological measures as appropriate and evaluate response  - Consider cultural and social influences on pain and pain management  - Manage/alleviate anxiety  - Utilize distraction and/or relaxation techniques  - Monitor for opioid side effects  - Notify MD/LIP if interventions unsuccessful or patient reports new pain  - Anticipate increased pain with activity and pre-medicate as appropriate  Outcome: Progressing     Problem: Delirium  Goal: Minimize duration of delirium  Description: Interventions:  - Encourage use of hearing aids, eye glasses  - Promote highest level of mobility daily  - Provide frequent reorientation  - Promote wakefulness i.e. lights on, blinds open  - Promote sleep, encourage patient's normal rest cycle i.e. lights off, TV off, minimize noise and interruptions  - Encourage family to assist in orientation and promotion of home routines  Outcome: Progressing

## 2023-02-23 NOTE — PLAN OF CARE
The patient is A/Ox4  On RA  Tele - NSR  Vitals Stable  Afebrile  No c/o of pain. Hx of stroke with L sided weakness. Peg TF Glucerna 1.2 @ 70ml/hr + 100 water flush q4h. Accuchecks, insuline coverage. Plan is to dc to [facility] for AR pending insurance auth. Safety precautions in place. Staff will continue to monitor. Problem: NEUROLOGICAL - ADULT  Goal: Achieves stable or improved neurological status  Description: INTERVENTIONS  - Assess for and report changes in neurological status  - Initiate measures to prevent increased intracranial pressure  - Maintain blood pressure and fluid volume within ordered parameters to optimize cerebral perfusion and minimize risk of hemorrhage  - Monitor temperature, glucose, and sodium.  Initiate appropriate interventions as ordered  Outcome: Progressing  Goal: Achieves maximal functionality and self care  Description: INTERVENTIONS  - Monitor swallowing and airway patency with patient fatigue and changes in neurological status  - Encourage and assist patient to increase activity and self care with guidance from PT/OT  - Encourage visually impaired, hearing impaired and aphasic patients to use assistive/communication devices  Outcome: Progressing     Problem: PAIN - ADULT  Goal: Verbalizes/displays adequate comfort level or patient's stated pain goal  Description: INTERVENTIONS:  - Encourage pt to monitor pain and request assistance  - Assess pain using appropriate pain scale  - Administer analgesics based on type and severity of pain and evaluate response  - Implement non-pharmacological measures as appropriate and evaluate response  - Consider cultural and social influences on pain and pain management  - Manage/alleviate anxiety  - Utilize distraction and/or relaxation techniques  - Monitor for opioid side effects  - Notify MD/LIP if interventions unsuccessful or patient reports new pain  - Anticipate increased pain with activity and pre-medicate as appropriate  Outcome: Progressing     Problem: Delirium  Goal: Minimize duration of delirium  Description: Interventions:  - Encourage use of hearing aids, eye glasses  - Promote highest level of mobility daily  - Provide frequent reorientation  - Promote wakefulness i.e. lights on, blinds open  - Promote sleep, encourage patient's normal rest cycle i.e. lights off, TV off, minimize noise and interruptions  - Encourage family to assist in orientation and promotion of home routines  Outcome: Progressing

## 2023-02-23 NOTE — CM/SW NOTE
Care Progression Note:  Length of stay: 10  GMLOS:   Avoidable Delays:   Code Status: Full    Acute Medical Issue/Factors:   Uncontrolled hypertension   Acute CVA, on DAPT. Plan Acute Rehab at [facility], pending insurance auth  Nutrition/dysphagia- pt with PEG placed 2/21/23      Discharge Barriers:   Expected discharge date:    Expected next site of care: [facility] pending insurance auth. / available for discharge planning.      Lula COYLEA MSN, RN CTL/  [id number]

## 2023-02-24 VITALS
OXYGEN SATURATION: 95 % | SYSTOLIC BLOOD PRESSURE: 134 MMHG | DIASTOLIC BLOOD PRESSURE: 81 MMHG | HEIGHT: 68 IN | RESPIRATION RATE: 22 BRPM | WEIGHT: 199 LBS | TEMPERATURE: 98 F | HEART RATE: 86 BPM | BODY MASS INDEX: 30.16 KG/M2

## 2023-02-24 LAB
ANION GAP SERPL CALC-SCNC: 6 MMOL/L (ref 0–18)
BASOPHILS # BLD AUTO: 0.07 X10(3) UL (ref 0–0.2)
BASOPHILS NFR BLD AUTO: 0.6 %
BUN BLD-MCNC: 21 MG/DL (ref 7–18)
CALCIUM BLD-MCNC: 8.5 MG/DL (ref 8.5–10.1)
CHLORIDE SERPL-SCNC: 106 MMOL/L (ref 98–112)
CO2 SERPL-SCNC: 26 MMOL/L (ref 21–32)
CREAT BLD-MCNC: 0.73 MG/DL
EOSINOPHIL # BLD AUTO: 0.34 X10(3) UL (ref 0–0.7)
EOSINOPHIL NFR BLD AUTO: 2.7 %
ERYTHROCYTE [DISTWIDTH] IN BLOOD BY AUTOMATED COUNT: 12.3 %
GFR SERPLBLD BASED ON 1.73 SQ M-ARVRAT: 102 ML/MIN/1.73M2 (ref 60–?)
GLUCOSE BLD-MCNC: 169 MG/DL (ref 70–99)
GLUCOSE BLD-MCNC: 184 MG/DL (ref 70–99)
GLUCOSE BLD-MCNC: 220 MG/DL (ref 70–99)
GLUCOSE BLD-MCNC: 232 MG/DL (ref 70–99)
HCT VFR BLD AUTO: 48.2 %
HGB BLD-MCNC: 16 G/DL
IMM GRANULOCYTES # BLD AUTO: 0.07 X10(3) UL (ref 0–1)
IMM GRANULOCYTES NFR BLD: 0.6 %
LYMPHOCYTES # BLD AUTO: 1.24 X10(3) UL (ref 1–4)
LYMPHOCYTES NFR BLD AUTO: 10 %
MCH RBC QN AUTO: 30.2 PG (ref 26–34)
MCHC RBC AUTO-ENTMCNC: 33.2 G/DL (ref 31–37)
MCV RBC AUTO: 91.1 FL
MONOCYTES # BLD AUTO: 0.87 X10(3) UL (ref 0.1–1)
MONOCYTES NFR BLD AUTO: 7 %
NEUTROPHILS # BLD AUTO: 9.81 X10 (3) UL (ref 1.5–7.7)
NEUTROPHILS # BLD AUTO: 9.81 X10(3) UL (ref 1.5–7.7)
NEUTROPHILS NFR BLD AUTO: 79.1 %
OSMOLALITY SERPL CALC.SUM OF ELEC: 296 MOSM/KG (ref 275–295)
PLATELET # BLD AUTO: 196 10(3)UL (ref 150–450)
POTASSIUM SERPL-SCNC: 3.9 MMOL/L (ref 3.5–5.1)
RBC # BLD AUTO: 5.29 X10(6)UL
SARS-COV-2 RNA RESP QL NAA+PROBE: NOT DETECTED
SODIUM SERPL-SCNC: 138 MMOL/L (ref 136–145)
WBC # BLD AUTO: 12.4 X10(3) UL (ref 4–11)

## 2023-02-24 PROCEDURE — 99239 HOSP IP/OBS DSCHRG MGMT >30: CPT | Performed by: INTERNAL MEDICINE

## 2023-02-24 RX ORDER — DOCUSATE SODIUM 100 MG/1
100 CAPSULE, LIQUID FILLED ORAL 2 TIMES DAILY
Status: DISCONTINUED | OUTPATIENT
Start: 2023-02-24 | End: 2023-02-24

## 2023-02-24 RX ORDER — SODIUM PHOSPHATE, DIBASIC AND SODIUM PHOSPHATE, MONOBASIC 7; 19 G/133ML; G/133ML
1 ENEMA RECTAL ONCE AS NEEDED
Status: DISCONTINUED | OUTPATIENT
Start: 2023-02-24 | End: 2023-02-24

## 2023-02-24 RX ORDER — BISACODYL 10 MG
10 SUPPOSITORY, RECTAL RECTAL
Status: DISCONTINUED | OUTPATIENT
Start: 2023-02-24 | End: 2023-02-24

## 2023-02-24 RX ORDER — INSULIN DETEMIR 100 [IU]/ML
14 INJECTION, SOLUTION SUBCUTANEOUS 2 TIMES DAILY
Qty: 9 ML | Refills: 0 | Status: SHIPPED | OUTPATIENT
Start: 2023-02-24

## 2023-02-24 RX ORDER — POLYETHYLENE GLYCOL 3350 17 G/17G
17 POWDER, FOR SOLUTION ORAL DAILY PRN
Refills: 0 | Status: SHIPPED | COMMUNITY
Start: 2023-02-24

## 2023-02-24 RX ORDER — POLYETHYLENE GLYCOL 3350 17 G/17G
17 POWDER, FOR SOLUTION ORAL DAILY PRN
Status: DISCONTINUED | OUTPATIENT
Start: 2023-02-24 | End: 2023-02-24

## 2023-02-24 NOTE — PLAN OF CARE
The patient is A/Ox3-4, on RA, no SOB, NSR on tele, Vitals stable, afebrile. No BM since 02/19/23, MD notified, bowel regiment ordered per MD, Miralax and Senokot given per [facility]. Peg tube is c/d/i, continues feedings. Discharge orders received. DC planning to [facility]. Wife is at the bedside. POC updates given. Nurse to nurse report given to [facility], # [phone], @ [facility]. NURSING DISCHARGE NOTE    Discharged Rehab facility via Ambulance. Accompanied by Family member  Belongings Taken by patient/family. IV and tele removed. Problem: NEUROLOGICAL - ADULT  Goal: Achieves stable or improved neurological status  Description: INTERVENTIONS  - Assess for and report changes in neurological status  - Initiate measures to prevent increased intracranial pressure  - Maintain blood pressure and fluid volume within ordered parameters to optimize cerebral perfusion and minimize risk of hemorrhage  - Monitor temperature, glucose, and sodium.  Initiate appropriate interventions as ordered  Outcome: Progressing  Goal: Achieves maximal functionality and self care  Description: INTERVENTIONS  - Monitor swallowing and airway patency with patient fatigue and changes in neurological status  - Encourage and assist patient to increase activity and self care with guidance from PT/OT  - Encourage visually impaired, hearing impaired and aphasic patients to use assistive/communication devices  Outcome: Progressing     Problem: PAIN - ADULT  Goal: Verbalizes/displays adequate comfort level or patient's stated pain goal  Description: INTERVENTIONS:  - Encourage pt to monitor pain and request assistance  - Assess pain using appropriate pain scale  - Administer analgesics based on type and severity of pain and evaluate response  - Implement non-pharmacological measures as appropriate and evaluate response  - Consider cultural and social influences on pain and pain management  - Manage/alleviate anxiety  - Utilize distraction and/or relaxation techniques  - Monitor for opioid side effects  - Notify MD/LIP if interventions unsuccessful or patient reports new pain  - Anticipate increased pain with activity and pre-medicate as appropriate  Outcome: Progressing     Problem: Delirium  Goal: Minimize duration of delirium  Description: Interventions:  - Encourage use of hearing aids, eye glasses  - Promote highest level of mobility daily  - Provide frequent reorientation  - Promote wakefulness i.e. lights on, blinds open  - Promote sleep, encourage patient's normal rest cycle i.e. lights off, TV off, minimize noise and interruptions  - Encourage family to assist in orientation and promotion of home routines  Outcome: Progressing

## 2023-02-24 NOTE — PLAN OF CARE
Assumed care at [address]. A/O x3. RA. NSR on tele. Denies pain. Briefed- primofit in place. L sided weakness- L arm elevated on pillow. LUQ PEG tube site CDI. Accuchecks q6h. NPO. TF Glucerna 1.2- infusing at 70 ml/hr with 100 ml water flush q4h. Plan to DC to acute rehab- Ins auth pending. Safety prec in place. Needs being met at this time. Problem: NEUROLOGICAL - ADULT  Goal: Achieves stable or improved neurological status  Description: INTERVENTIONS  - Assess for and report changes in neurological status  - Initiate measures to prevent increased intracranial pressure  - Maintain blood pressure and fluid volume within ordered parameters to optimize cerebral perfusion and minimize risk of hemorrhage  - Monitor temperature, glucose, and sodium.  Initiate appropriate interventions as ordered  Outcome: Progressing  Goal: Achieves maximal functionality and self care  Description: INTERVENTIONS  - Monitor swallowing and airway patency with patient fatigue and changes in neurological status  - Encourage and assist patient to increase activity and self care with guidance from PT/OT  - Encourage visually impaired, hearing impaired and aphasic patients to use assistive/communication devices  Outcome: Progressing     Problem: PAIN - ADULT  Goal: Verbalizes/displays adequate comfort level or patient's stated pain goal  Description: INTERVENTIONS:  - Encourage pt to monitor pain and request assistance  - Assess pain using appropriate pain scale  - Administer analgesics based on type and severity of pain and evaluate response  - Implement non-pharmacological measures as appropriate and evaluate response  - Consider cultural and social influences on pain and pain management  - Manage/alleviate anxiety  - Utilize distraction and/or relaxation techniques  - Monitor for opioid side effects  - Notify MD/LIP if interventions unsuccessful or patient reports new pain  - Anticipate increased pain with activity and pre-medicate as appropriate  Outcome: Progressing     Problem: Delirium  Goal: Minimize duration of delirium  Description: Interventions:  - Encourage use of hearing aids, eye glasses  - Promote highest level of mobility daily  - Provide frequent reorientation  - Promote wakefulness i.e. lights on, blinds open  - Promote sleep, encourage patient's normal rest cycle i.e. lights off, TV off, minimize noise and interruptions  - Encourage family to assist in orientation and promotion of home routines  Outcome: Progressing

## 2023-02-24 NOTE — CM/SW NOTE
[facility] updated MSW that they have insurance auth. They requested updated labs. MSW sent message to Rn for labs.     10:39am  BLS on will call, PCS completed  Wife updated on possible dc today    2pm  DC TIME 3pm

## 2023-02-26 ENCOUNTER — PATIENT OUTREACH (OUTPATIENT)
Dept: CASE MANAGEMENT | Age: 63
End: 2023-02-26

## 2023-03-28 ENCOUNTER — TELEPHONE (OUTPATIENT)
Dept: ORTHOPEDICS CLINIC | Facility: CLINIC | Age: 63
End: 2023-03-28

## 2023-03-28 NOTE — TELEPHONE ENCOUNTER
Patient is scheduled with Dr. Ariel More for arthritis in the left knee. Please advise if imaging is needed.

## 2023-05-09 ENCOUNTER — OFFICE VISIT (OUTPATIENT)
Dept: ORTHOPEDICS CLINIC | Facility: CLINIC | Age: 63
End: 2023-05-09
Payer: COMMERCIAL

## 2023-05-09 VITALS — WEIGHT: 199 LBS | BODY MASS INDEX: 30.16 KG/M2 | HEIGHT: 68 IN

## 2023-05-09 DIAGNOSIS — M17.0 PRIMARY OSTEOARTHRITIS OF BOTH KNEES: Primary | ICD-10-CM

## 2023-05-09 RX ORDER — GINSENG 100 MG
1 CAPSULE ORAL 3 TIMES DAILY
COMMUNITY
Start: 2023-03-23

## 2023-05-09 RX ORDER — TAMSULOSIN HYDROCHLORIDE 0.4 MG/1
0.8 CAPSULE ORAL NIGHTLY
COMMUNITY
Start: 2023-03-23

## 2023-05-09 RX ORDER — TRIAMCINOLONE ACETONIDE 40 MG/ML
40 INJECTION, SUSPENSION INTRA-ARTICULAR; INTRAMUSCULAR ONCE
Status: SHIPPED | OUTPATIENT
Start: 2023-05-09

## 2023-05-09 RX ORDER — INSULIN GLARGINE 100 [IU]/ML
16 INJECTION, SOLUTION SUBCUTANEOUS DAILY
COMMUNITY
Start: 2023-03-23

## 2023-05-09 RX ORDER — BACLOFEN 10 MG/1
10 TABLET ORAL NIGHTLY
COMMUNITY
Start: 2023-03-23

## 2023-05-09 RX ORDER — FINASTERIDE 5 MG/1
5 TABLET, FILM COATED ORAL DAILY
COMMUNITY
Start: 2023-03-23

## 2023-05-09 RX ORDER — BISACODYL 10 MG
10 SUPPOSITORY, RECTAL RECTAL
COMMUNITY
Start: 2023-03-23

## 2023-05-09 RX ORDER — ENOXAPARIN SODIUM 100 MG/ML
40 INJECTION SUBCUTANEOUS NIGHTLY
COMMUNITY
Start: 2023-03-23

## 2023-05-09 RX ORDER — MAGNESIUM OXIDE 400 MG (241.3 MG MAGNESIUM) TABLET
2 TABLET 3 TIMES DAILY PRN
COMMUNITY
Start: 2023-03-23

## 2023-05-11 NOTE — PROGRESS NOTES
Patient is a 75-year-old male here for evaluation of his knees. Patient has had knee pain for several years. Somewhat worse over the last year or 2. Patient did have a fall few months ago injuring his knees. Patient is here because of persistent pain of his knees. Difficulties going up and down stairs. Also some pain just associated with sitting. Patient's exam shows that have mildly antalgic gait bilaterally. Patient has tenderness on the medial aspect of both knees. Mild varus alignment of the knees. Ligaments are stable. Range of motion lacks couple degrees of extension flexion about 130 degrees bilaterally. Straight leg raising is intact. Negative Homans. Neurologically intact light touch lower extremities. Motor exam grossly 5/5. X-rays of his knees shows tricompartmental narrowing of both knees. Impression is that of degenerative arthritis of the knees bilaterally. Second impression is that of synovitis of the knees bilaterally. Recommendations to go ahead with cortisone injections which were done under sterile technique with 4 cc of Xylocaine and Marcaine and 40 mg of Kenalog. Patient tolerated the injections well. If these are not effective for extended period time I would give some consideration to gel injections.

## 2023-05-31 ENCOUNTER — OFFICE VISIT (OUTPATIENT)
Dept: NEUROLOGY | Facility: CLINIC | Age: 63
End: 2023-05-31
Payer: COMMERCIAL

## 2023-05-31 VITALS
SYSTOLIC BLOOD PRESSURE: 115 MMHG | WEIGHT: 166 LBS | HEART RATE: 88 BPM | BODY MASS INDEX: 25 KG/M2 | RESPIRATION RATE: 16 BRPM | DIASTOLIC BLOOD PRESSURE: 64 MMHG

## 2023-05-31 DIAGNOSIS — Z86.73 HISTORY OF STROKE: Primary | ICD-10-CM

## 2023-05-31 PROCEDURE — 3074F SYST BP LT 130 MM HG: CPT | Performed by: OTHER

## 2023-05-31 PROCEDURE — 3078F DIAST BP <80 MM HG: CPT | Performed by: OTHER

## 2023-05-31 PROCEDURE — 99213 OFFICE O/P EST LOW 20 MIN: CPT | Performed by: OTHER

## 2023-05-31 RX ORDER — SERTRALINE HYDROCHLORIDE 100 MG/1
TABLET, FILM COATED ORAL
COMMUNITY
Start: 2023-05-06

## 2023-05-31 NOTE — PROGRESS NOTES
Pt seen IP for CVA Feb 06, 2023. Pt went to acute rehab for 30 days, followed by sub-acute for 30 days and now in SNF for rehab. Pt's family denies any new stroke symptoms, reports no improvement.

## 2023-06-27 ENCOUNTER — OFFICE VISIT (OUTPATIENT)
Facility: LOCATION | Age: 63
End: 2023-06-27
Payer: COMMERCIAL

## 2023-06-27 VITALS — HEART RATE: 85 BPM | TEMPERATURE: 98 F

## 2023-06-27 DIAGNOSIS — K80.20 GALLSTONES: ICD-10-CM

## 2023-06-27 DIAGNOSIS — R11.15 EMESIS, PERSISTENT: Primary | ICD-10-CM

## 2023-06-27 PROCEDURE — 99204 OFFICE O/P NEW MOD 45 MIN: CPT | Performed by: SURGERY

## 2023-08-09 RX ORDER — ONDANSETRON 4 MG/1
4 TABLET, ORALLY DISINTEGRATING ORAL
COMMUNITY

## 2023-08-09 RX ORDER — BUPROPION HYDROCHLORIDE 150 MG/1
150 TABLET, EXTENDED RELEASE ORAL 2 TIMES DAILY
COMMUNITY

## 2023-08-09 RX ORDER — ACETAMINOPHEN 325 MG/1
325 TABLET ORAL EVERY 4 HOURS PRN
COMMUNITY

## 2023-08-14 ENCOUNTER — ANESTHESIA (OUTPATIENT)
Dept: ENDOSCOPY | Facility: HOSPITAL | Age: 63
End: 2023-08-14
Payer: COMMERCIAL

## 2023-08-14 ENCOUNTER — ANESTHESIA EVENT (OUTPATIENT)
Dept: ENDOSCOPY | Facility: HOSPITAL | Age: 63
End: 2023-08-14
Payer: COMMERCIAL

## 2023-08-14 ENCOUNTER — HOSPITAL ENCOUNTER (OUTPATIENT)
Facility: HOSPITAL | Age: 63
Setting detail: HOSPITAL OUTPATIENT SURGERY
Discharge: NURSING, REHAB OR LONG-TERM CARE | End: 2023-08-14
Attending: STUDENT IN AN ORGANIZED HEALTH CARE EDUCATION/TRAINING PROGRAM | Admitting: STUDENT IN AN ORGANIZED HEALTH CARE EDUCATION/TRAINING PROGRAM
Payer: COMMERCIAL

## 2023-08-14 VITALS
BODY MASS INDEX: 22.76 KG/M2 | TEMPERATURE: 98 F | WEIGHT: 145 LBS | DIASTOLIC BLOOD PRESSURE: 92 MMHG | OXYGEN SATURATION: 93 % | RESPIRATION RATE: 16 BRPM | HEART RATE: 84 BPM | SYSTOLIC BLOOD PRESSURE: 129 MMHG | HEIGHT: 67 IN

## 2023-08-14 DIAGNOSIS — R11.2 NAUSEA AND VOMITING, UNSPECIFIED VOMITING TYPE: ICD-10-CM

## 2023-08-14 DIAGNOSIS — Z93.1 GASTROSTOMY TUBE IN PLACE (HCC): ICD-10-CM

## 2023-08-14 LAB — GLUCOSE BLD-MCNC: 90 MG/DL (ref 70–99)

## 2023-08-14 PROCEDURE — 88305 TISSUE EXAM BY PATHOLOGIST: CPT | Performed by: STUDENT IN AN ORGANIZED HEALTH CARE EDUCATION/TRAINING PROGRAM

## 2023-08-14 PROCEDURE — 0DB98ZX EXCISION OF DUODENUM, VIA NATURAL OR ARTIFICIAL OPENING ENDOSCOPIC, DIAGNOSTIC: ICD-10-PCS | Performed by: INTERNAL MEDICINE

## 2023-08-14 PROCEDURE — 0DB38ZX EXCISION OF LOWER ESOPHAGUS, VIA NATURAL OR ARTIFICIAL OPENING ENDOSCOPIC, DIAGNOSTIC: ICD-10-PCS | Performed by: INTERNAL MEDICINE

## 2023-08-14 PROCEDURE — 82962 GLUCOSE BLOOD TEST: CPT

## 2023-08-14 PROCEDURE — 0DB68ZX EXCISION OF STOMACH, VIA NATURAL OR ARTIFICIAL OPENING ENDOSCOPIC, DIAGNOSTIC: ICD-10-PCS | Performed by: INTERNAL MEDICINE

## 2023-08-14 RX ORDER — NICOTINE POLACRILEX 4 MG
30 LOZENGE BUCCAL
Status: DISCONTINUED | OUTPATIENT
Start: 2023-08-14 | End: 2023-08-14

## 2023-08-14 RX ORDER — NICOTINE POLACRILEX 4 MG
15 LOZENGE BUCCAL
Status: DISCONTINUED | OUTPATIENT
Start: 2023-08-14 | End: 2023-08-14

## 2023-08-14 RX ORDER — SODIUM CHLORIDE, SODIUM LACTATE, POTASSIUM CHLORIDE, CALCIUM CHLORIDE 600; 310; 30; 20 MG/100ML; MG/100ML; MG/100ML; MG/100ML
INJECTION, SOLUTION INTRAVENOUS CONTINUOUS
Status: DISCONTINUED | OUTPATIENT
Start: 2023-08-14 | End: 2023-08-14

## 2023-08-14 RX ORDER — ETOMIDATE 2 MG/ML
INJECTION INTRAVENOUS AS NEEDED
Status: DISCONTINUED | OUTPATIENT
Start: 2023-08-14 | End: 2023-08-14 | Stop reason: SURG

## 2023-08-14 RX ORDER — DEXTROSE MONOHYDRATE 25 G/50ML
50 INJECTION, SOLUTION INTRAVENOUS
Status: DISCONTINUED | OUTPATIENT
Start: 2023-08-14 | End: 2023-08-14

## 2023-08-14 RX ORDER — LIDOCAINE HYDROCHLORIDE 10 MG/ML
INJECTION, SOLUTION EPIDURAL; INFILTRATION; INTRACAUDAL; PERINEURAL AS NEEDED
Status: DISCONTINUED | OUTPATIENT
Start: 2023-08-14 | End: 2023-08-14 | Stop reason: SURG

## 2023-08-14 RX ORDER — ONDANSETRON 2 MG/ML
4 INJECTION INTRAMUSCULAR; INTRAVENOUS AS NEEDED
Status: DISCONTINUED | OUTPATIENT
Start: 2023-08-14 | End: 2023-08-14

## 2023-08-14 RX ORDER — ONDANSETRON 2 MG/ML
INJECTION INTRAMUSCULAR; INTRAVENOUS AS NEEDED
Status: DISCONTINUED | OUTPATIENT
Start: 2023-08-14 | End: 2023-08-14 | Stop reason: SURG

## 2023-08-14 RX ADMIN — ETOMIDATE 8 MG: 2 INJECTION INTRAVENOUS at 10:12:00

## 2023-08-14 RX ADMIN — LIDOCAINE HYDROCHLORIDE 50 MG: 10 INJECTION, SOLUTION EPIDURAL; INFILTRATION; INTRACAUDAL; PERINEURAL at 10:12:00

## 2023-08-14 RX ADMIN — ONDANSETRON 4 MG: 2 INJECTION INTRAMUSCULAR; INTRAVENOUS at 10:22:00

## 2023-08-14 RX ADMIN — SODIUM CHLORIDE, SODIUM LACTATE, POTASSIUM CHLORIDE, CALCIUM CHLORIDE: 600; 310; 30; 20 INJECTION, SOLUTION INTRAVENOUS at 10:10:00

## 2023-08-14 NOTE — OPERATIVE REPORT
EGD Operative Report  Patient Name: Sergo Alexander  YOB: 1960  MRN: [id number]  Procedure: Esophagogastroduodenoscopy (EGD) with biopsy  Pre-operative Diagnosis & Indication: Nausea and emesis  Post-operative Diagnosis:   Irregular GE junction at 40 cm status post distal esophageal biopsies to assess for reflux esophagitis and Montanez's esophagus  Gastritis status post biopsy to rule out H. pylori  PEG tube in place with no erythema or ulceration noted under internal PEG tube bumper  6 mm clean-based ulcer in the duodenal bulb status post duodenal biopsies to rule out celiac disease  Attending Endoscopist: Albina Hermosillo D.O. Informed Consent: The planned procedure(s), the explanation of the procedure, its expected benefits, the potential complications and risks and possible alternatives and their benefits and risks were discussed with the patient or the patient's surrogate. The discussion of risks, not limited to but including bleeding, infection, perforation, adverse effects from anesthesia, need for emergency surgery/prolonged hospitalization,  cardiac arrhythmias,  and aspiration were discussed with patient. Pt and/or surrogate understood the proposed procedure(s), its risks, benefits and alternatives and wish to proceed with procedure(s). All questions answered in full. After all questions were answered to their satisfaction, a signed, informed, and witnessed consent was obtained. A TIME OUT WAS COMPLETED prior to the procedure to confirm the patient, procedure(s) and complete endoscopy safety procedure. Sedation: Monitored Anesthesia Care; ASA class per anesthesiology team   Monitoring: Pulsoximetry, pulse, respirations, and blood pressure , vitals were monitored throughout the entire procedure under monitored anesthesia care. Procedure: The patient was then brought to the endoscopy suite where his/her pulse, pulse oximetry and blood pressure were monitored.  The patient was placed in the left lateral decubitus position and deep sedation was administered. Once adequate sedation was achieved, a bite block was placed and a lubricated tip of an Olympus video upper endoscope was inserted through the oropharynx and gently manipulated through the esophagus into the stomach and the second portion of the duodenum. Upon withdrawal of the endoscope, careful visualization of the mucosa was performed. The endoscope was then withdrawn into the gastric antrum and placed in a retroflexed position. The endoscope was then righted, and air was suctioned from the stomach. The endoscope was then withdrawn from the patient, with careful visual inspection of the mucosa. The patient left the procedure room in stable condition for recovery. Findings and endotherapy as listed below  Toleration: Patient tolerated procedure well  Complications: No immediate complications  Technical Difficulty: The procedure was not technically difficult   Estimated Blood Loss: Minimal, less than 5mL of estimated blood loss. Findings and Therapeutics:  Esophagus: The mucosa was normal.   There were no strictures or stenosis. GEJ junction traversed with endoscope without resistance. The Z-line was irregular, appreciated at 40 cm from the incisors. Biopsies were obtained with cold forceps in the distal esophagus to rule out reflux esophagitis and Montanez's esophagus. Stomach: The gastric body, antrum, fundus, cardia, and angularis were erythematous endoscopic consistent with gastritis. G-tube was noted in place in the gastric body. No ulcers, erosions or masses visualized throughout the stomach and underneath the internal G-tube bumper. Endoscope was placed in a retroflexion view in the stomach. There was no evidence of a hiatal hernia. Biopsies with cold forceps were obtained to assess for H. pylori. Duodenum:   There is a 6 mm clean-based ulcer in the duodenal bulb.   Duodenal biopsies were taken to assess for celiac disease. Recommendations:  Post EGD precautions, watch for bleeding, infection, perforation, adverse drug reactions   Follow-up biopsies  Omeprazole 40 mg twice daily through G-tube  Avoid non-aspirin NSAID  Follow-up outpatient in 1 to 2 months with Dr. Nikhil Vaca or nurse practitioner.     Shannan Huynh DO  8/14/2023  10:28 AM

## 2023-08-14 NOTE — ANESTHESIA POSTPROCEDURE EVALUATION
[address] [place] [address] Patient Status:  Hospital Outpatient Surgery   Age/Gender 57year old male MRN [id number]   Location [address] [address] Attending Henna Boston DO   Hosp Day # 0 PCP None Pcp       Anesthesia Post-op Note    ESOPHAGOGASTRODUODENOSCOPY (EGD)   with biopsy    Procedure Summary       Date: 08/14/23 Room / Location: [facility] ENDOSCOPY 02 / 140[facility] ENDOSCOPY    Anesthesia Start: 1011 Anesthesia Stop: 1030    Procedure: ESOPHAGOGASTRODUODENOSCOPY (EGD)   with biopsy Diagnosis:       Nausea and vomiting, unspecified vomiting type      Gastrostomy tube in place ([facility] [address]      (irregular GE junction, gastritis, PEG tube in place, duodenal ulcer)    Surgeons: Henna Boston DO Anesthesiologist: Antionette Tsai MD    Anesthesia Type: MAC ASA Status: 3            Anesthesia Type: MAC    Vitals Value Taken Time   /74 08/14/23 1032   Temp  08/14/23 1032   Pulse 16 08/14/23 1032   Resp 80 08/14/23 1032   SpO2 95 08/14/23 1032       Patient Location: Endoscopy    Anesthesia Type: MAC    Airway Patency: patent    Postop Pain Control: adequate    Mental Status: mildly sedated but able to meaningfully participate in the post-anesthesia evaluation    Nausea/Vomiting: none    Cardiopulmonary/Hydration status: stable euvolemic    Complications: no apparent anesthesia related complications    Postop vital signs: stable    Dental Exam: Unchanged from Preop    Patient to be discharged home when criteria met.

## 2023-08-14 NOTE — H&P
History & Physical Examination    Patient Name: Eyal Ratliff  MRN: [id number]  [facility]: [id number]  YOB: 1960    Diagnosis: Nausea and emesis    Present Illness: 61y/o male history of hypertension, hyperlipidemia, diabetes, CVA, and PEG tube placement in February 2023 presents for EGD. acetaminophen 325 MG Oral Tab, 1 tablet (325 mg total) by Per G Tube route every 4 (four) hours as needed for Pain., Disp: , Rfl: , 8/14/2023  ondansetron 4 MG Oral Tablet Dispersible, 1 tablet (4 mg total) by Per NG Tube route every 4 to 6 hours as needed for Nausea., Disp: , Rfl: , 8/13/2023  buPROPion  MG Oral Tablet 12 Hr, Take 1 tablet (150 mg total) by mouth 2 (two) times daily. , Disp: , Rfl: , 8/13/2023  sertraline 100 MG Oral Tab, by Per G Tube route., Disp: , Rfl: , 8/13/2023  Calcium Carbonate 1250 (500 Ca) MG Oral Chew Tab, Chew 2 tablets (2,500 mg total) by mouth 3 (three) times daily as needed. , Disp: , Rfl: , 8/13/2023  bisacodyl 10 MG Rectal Suppos, Place 1 suppository (10 mg total) rectally daily as needed. , Disp: , Rfl: , 8/13/2023  baclofen 10 MG Oral Tab, 1 tablet (10 mg total) by Per G Tube route nightly., Disp: , Rfl: , 8/14/2023  bacitracin 500 UNIT/GM External Ointment, Apply 1 Application topically 3 (three) times daily. Apply to stoma q shift, Disp: , Rfl: , 8/13/2023  diclofenac 1 % External Gel, Apply 2 g topically 3 (three) times daily. , Disp: , Rfl: , 8/13/2023  finasteride 5 MG Oral Tab, Take 1 tablet (5 mg total) by mouth daily. , Disp: , Rfl: , 8/13/2023  insulin aspart 100 Units/mL Subcutaneous Solution Pen-injector, Inject 2 Units into the skin., Disp: , Rfl: , 8/13/2023  insulin glargine (LANTUS SOLOSTAR) 100 UNIT/ML Subcutaneous Solution Pen-injector, Inject 16 Units into the skin daily. , Disp: , Rfl: , 8/13/2023  magnesium hydroxide 400 MG/5ML Oral Suspension, Take 30 mL by mouth daily as needed. , Disp: , Rfl: , 8/13/2023  metFORMIN 500 MG Oral Tab, 1 tablet (500 mg total) by Per G Tube route 2 (two) times daily with meals. , Disp: , Rfl: , 8/13/2023  tamsulosin 0.4 MG Oral Cap, Take 2 capsules (0.8 mg total) by mouth nightly., Disp: , Rfl: , 8/13/2023  insulin detemir (LEVEMIR FLEXTOUCH) 100 UNIT/ML Subcutaneous Solution Pen-injector, Inject 14 Units into the skin 2 (two) times daily. , Disp: 9 mL, Rfl: 0, 8/13/2023  polyethylene glycol, PEG 3350, 17 g Oral Powd Pack, Take 17 g by mouth daily as needed. , Disp: , Rfl: 0, 8/13/2023  omeprazole 2 mg/mL Oral Suspension, 20 mL (40 mg total) by Per G Tube route daily. , Disp: , Rfl: 0, 8/13/2023  hydrALAZINE 50 MG Oral Tab, Take 1 tablet (50 mg total) by mouth every 8 (eight) hours. , Disp: 90 tablet, Rfl: 0, 8/13/2023  metoprolol tartrate 25 MG Oral Tab, Take 1 tablet (25 mg total) by mouth 2x Daily(Beta Blocker). Hold for SBP < 100 and HR <60 (Patient taking differently: 1 tablet (25 mg total) by Per G Tube route 2x Daily(Beta Blocker). Hold for SBP < 100 and HR <60), Disp: 60 tablet, Rfl: 0, 8/13/2023  senna-docusate 8.6-50 MG Oral Tab, Take 1 tablet by mouth daily. (Patient taking differently: 1 tablet by Per G Tube route.), Disp: 30 tablet, Rfl: 0, 8/13/2023  HYDROcodone-acetaminophen 5-325 MG Oral Tab, Take 1 tablet by mouth every 4 (four) hours as needed. (Patient taking differently: 1 tablet by Per G Tube route every 6 (six) hours as needed.), Disp: 20 tablet, Rfl: 0, 8/13/2023  cyclobenzaprine 5 MG Oral Tab, Take 1 tablet (5 mg total) by mouth 3 (three) times daily as needed for Muscle spasms. , Disp: 30 tablet, Rfl: 0, Past Week  Insulin Lispro, 1 Unit Dial, (HUMALOG KWIKPEN) 100 UNIT/ML Subcutaneous Solution Pen-injector, Test blood glucose 4 times daily before meals  Inject 1 unit of insulin for every 30 points blood glucose is greater than 140 mg/dL Inject insulin into the skin prior to meals Inject 1 unit of insulin for every 10 grams of carbohydrates eaten Inject within 10 minutes before or after a meal,, Disp: 15 mL, Rfl: 0, 8/13/2023  losartan 100 MG Oral Tab, Take 1 tablet (100 mg total) by mouth daily. , Disp: 30 tablet, Rfl: 0, 8/13/2023  amLODIPine 10 MG Oral Tab, Take 1 tablet (10 mg total) by mouth daily. , Disp: 30 tablet, Rfl: 0, 8/13/2023  escitalopram 5 MG Oral Tab, Take 1 tablet (5 mg total) by mouth daily. (Patient taking differently: 1 tablet (5 mg total) by Per G Tube route daily.), Disp: 30 tablet, Rfl: 0, 8/13/2023  atorvastatin 80 MG Oral Tab, Take 1 tablet (80 mg total) by mouth nightly.  (Patient taking differently: 1 tablet (80 mg total) by Per G Tube route nightly.), Disp: 30 tablet, Rfl: 2, 8/13/2023  Insulin Pen Needle (BD PEN NEEDLE YESICA U/F) 32G X 4 MM Does not apply Misc, Use a new pen needle with each injection as directed by your doctor, Disp: 100 each, Rfl: 6      glucose (Dex4) 15 GM/59ML oral liquid 15 g, 15 g, Oral, Q15 Min PRN   Or  glucose (Glutose) 40% oral gel 15 g, 15 g, Oral, Q15 Min PRN   Or  glucose-vitamin C (Dex-4) chewable tab 4 tablet, 4 tablet, Oral, Q15 Min PRN   Or  dextrose 50% injection 50 mL, 50 mL, Intravenous, Q15 Min PRN   Or  glucose (Dex4) 15 GM/59ML oral liquid 30 g, 30 g, Oral, Q15 Min PRN   Or  glucose (Glutose) 40% oral gel 30 g, 30 g, Oral, Q15 Min PRN   Or  glucose-vitamin C (Dex-4) chewable tab 8 tablet, 8 tablet, Oral, Q15 Min PRN  lactated ringers infusion, , Intravenous, Continuous  lactated ringers infusion, , Intravenous, Continuous  atropine 0.1 MG/ML injection 0.5 mg, 0.5 mg, Intravenous, PRN  ondansetron (Zofran) 4 MG/2ML injection 4 mg, 4 mg, Intravenous, PRN        Allergies:   Seasonal                    Past Medical History:   Diagnosis Date    Acute, but ill-defined, cerebrovascular disease 2/6/23    Arthritis     Dementia ([facility] [address]     Diabetes ([address]     Diabetes mellitus ([facility]     Essential hypertension     Heartburn     High blood pressure     High cholesterol     History of depression     Indigestion     Loss of appetite     Nausea     Osteoarthritis     bilateral knees    Problems with swallowing     Stool incontinence     Stroke [facility])     stroke 2/2023 with left sided weakness    Visual impairment     glasses    Vomiting     Weight loss      Past Surgical History:   Procedure Laterality Date    TONSILLECTOMY       Family History   Problem Relation Age of Onset    Cancer Father         bladder cancer    Stroke Mother     Hypertension Mother      Social History    Tobacco Use      Smoking status: Never      Smokeless tobacco: Never    Alcohol use: Not Currently      SYSTEM Check if Review is Normal Check if Physical Exam is Normal If not normal, please explain:   HEENT [ x] [x ]    NECK & BACK [ x] [ x]    HEART [ x] [x ]    LUNGS [ x] [ x]    ABDOMEN [ x] [x ]    UROGENITAL [ n/a] [ n/a]    EXTREMITIES [ x] [x ]    OTHER        [ x ] I have discussed the risks and benefits and alternatives with the patient/family. They understand and agree to proceed with plan of care. [ x ] I have reviewed the History and Physical done within the last 30 days. Any changes noted above.     Yanet Walker DO  8/14/2023  10:40 AM

## 2023-08-14 NOTE — DISCHARGE INSTRUCTIONS
Home Care Instructions for Gastroscopy with Sedation    Diet:  - Resume your regular diet as tolerated unless otherwise instructed. - Start with light meals to minimize bloating.  - Do not drink alcohol today. Medication:  - If you have questions about resuming your normal medications, please contact your Primary Care Physician. Activities:  - Take it easy today. Do not return to work today. - Do not drive today. - Do not operate any machinery today (including kitchen equipment). Gastroscopy:  - You may have a sore throat for 2-3 days following the exam. This is normal. Gargling with warm salt water (1/2 tsp salt to 1 glass warm water) or using throat lozenges will help. - If you experience any sharp pain in your neck, abdomen or chest, vomiting of blood, oral temperature over 100 degrees Fahrenheit, light-headedness or dizziness, or any other problems, contact your doctor. **If unable to reach your doctor, please go to the [facility] Emergency Room**    - Your referring physician will receive a full report of your examination.  - If you do not hear from your doctor's office within two weeks of your biopsy, please call them for your results. Post EGD precautions, watch for bleeding, infection, perforation, adverse drug reactions   Follow-up biopsies  Omeprazole 40 mg twice daily through G-tube  Avoid non-aspirin NSAID  Follow-up outpatient in 1 to 2 months with Dr. Karen Gaona or nurse practitioner.

## 2023-09-20 ENCOUNTER — TELEPHONE (OUTPATIENT)
Facility: LOCATION | Age: 63
End: 2023-09-20

## 2023-09-20 ENCOUNTER — APPOINTMENT (OUTPATIENT)
Dept: ULTRASOUND IMAGING | Facility: HOSPITAL | Age: 63
End: 2023-09-20
Attending: EMERGENCY MEDICINE
Payer: COMMERCIAL

## 2023-09-20 ENCOUNTER — HOSPITAL ENCOUNTER (INPATIENT)
Facility: HOSPITAL | Age: 63
LOS: 3 days | Discharge: NURSING, REHAB OR LONG-TERM CARE | End: 2023-09-23
Attending: EMERGENCY MEDICINE | Admitting: INTERNAL MEDICINE
Payer: COMMERCIAL

## 2023-09-20 ENCOUNTER — APPOINTMENT (OUTPATIENT)
Dept: CT IMAGING | Facility: HOSPITAL | Age: 63
End: 2023-09-20
Attending: EMERGENCY MEDICINE
Payer: COMMERCIAL

## 2023-09-20 DIAGNOSIS — K81.0 ACUTE CHOLECYSTITIS: ICD-10-CM

## 2023-09-20 DIAGNOSIS — K80.50 BILIARY COLIC: Primary | ICD-10-CM

## 2023-09-20 PROBLEM — D72.829 LEUKOCYTOSIS: Status: ACTIVE | Noted: 2023-09-20

## 2023-09-20 PROBLEM — E87.6 HYPOKALEMIA: Status: ACTIVE | Noted: 2023-09-20

## 2023-09-20 PROBLEM — R79.89 AZOTEMIA: Status: ACTIVE | Noted: 2023-09-20

## 2023-09-20 LAB
ALBUMIN SERPL-MCNC: 2 G/DL (ref 3.4–5)
ALBUMIN/GLOB SERPL: 0.6 {RATIO} (ref 1–2)
ALP LIVER SERPL-CCNC: 322 U/L
ALT SERPL-CCNC: 256 U/L
ANION GAP SERPL CALC-SCNC: 7 MMOL/L (ref 0–18)
AST SERPL-CCNC: 231 U/L (ref 15–37)
BASOPHILS # BLD AUTO: 0.02 X10(3) UL (ref 0–0.2)
BASOPHILS NFR BLD AUTO: 0.1 %
BILIRUB SERPL-MCNC: 1.2 MG/DL (ref 0.1–2)
BILIRUB UR QL STRIP.AUTO: NEGATIVE
BUN BLD-MCNC: 15 MG/DL (ref 7–18)
CALCIUM BLD-MCNC: 7.9 MG/DL (ref 8.5–10.1)
CHLORIDE SERPL-SCNC: 105 MMOL/L (ref 98–112)
CLARITY UR REFRACT.AUTO: CLEAR
CO2 SERPL-SCNC: 22 MMOL/L (ref 21–32)
COLOR UR AUTO: YELLOW
CREAT BLD-MCNC: 0.51 MG/DL
EGFRCR SERPLBLD CKD-EPI 2021: 113 ML/MIN/1.73M2 (ref 60–?)
EOSINOPHIL # BLD AUTO: 0.02 X10(3) UL (ref 0–0.7)
EOSINOPHIL NFR BLD AUTO: 0.1 %
ERYTHROCYTE [DISTWIDTH] IN BLOOD BY AUTOMATED COUNT: 12.9 %
GLOBULIN PLAS-MCNC: 3.2 G/DL (ref 2.8–4.4)
GLUCOSE BLD-MCNC: 118 MG/DL (ref 70–99)
GLUCOSE UR STRIP.AUTO-MCNC: NEGATIVE MG/DL
HCT VFR BLD AUTO: 33.3 %
HGB BLD-MCNC: 11.1 G/DL
IMM GRANULOCYTES # BLD AUTO: 0.15 X10(3) UL (ref 0–1)
IMM GRANULOCYTES NFR BLD: 0.8 %
KETONES UR STRIP.AUTO-MCNC: NEGATIVE MG/DL
LACTATE SERPL-SCNC: 2.1 MMOL/L (ref 0.4–2)
LACTATE SERPL-SCNC: 2.7 MMOL/L (ref 0.4–2)
LEUKOCYTE ESTERASE UR QL STRIP.AUTO: NEGATIVE
LIPASE SERPL-CCNC: 17 U/L (ref 13–75)
LYMPHOCYTES # BLD AUTO: 0.9 X10(3) UL (ref 1–4)
LYMPHOCYTES NFR BLD AUTO: 4.8 %
MCH RBC QN AUTO: 32.5 PG (ref 26–34)
MCHC RBC AUTO-ENTMCNC: 33.3 G/DL (ref 31–37)
MCV RBC AUTO: 97.4 FL
MONOCYTES # BLD AUTO: 1.46 X10(3) UL (ref 0.1–1)
MONOCYTES NFR BLD AUTO: 7.7 %
NEUTROPHILS # BLD AUTO: 16.35 X10 (3) UL (ref 1.5–7.7)
NEUTROPHILS # BLD AUTO: 16.35 X10(3) UL (ref 1.5–7.7)
NEUTROPHILS NFR BLD AUTO: 86.5 %
NITRITE UR QL STRIP.AUTO: NEGATIVE
OSMOLALITY SERPL CALC.SUM OF ELEC: 280 MOSM/KG (ref 275–295)
PH UR STRIP.AUTO: 6 [PH] (ref 5–8)
PLATELET # BLD AUTO: 198 10(3)UL (ref 150–450)
POTASSIUM SERPL-SCNC: 3.5 MMOL/L (ref 3.5–5.1)
PROT SERPL-MCNC: 5.2 G/DL (ref 6.4–8.2)
PROT UR STRIP.AUTO-MCNC: NEGATIVE MG/DL
RBC # BLD AUTO: 3.42 X10(6)UL
RBC UR QL AUTO: NEGATIVE
SARS-COV-2 RNA RESP QL NAA+PROBE: NOT DETECTED
SODIUM SERPL-SCNC: 134 MMOL/L (ref 136–145)
SP GR UR STRIP.AUTO: 1.01 (ref 1–1.03)
UROBILINOGEN UR STRIP.AUTO-MCNC: 1 MG/DL
WBC # BLD AUTO: 18.9 X10(3) UL (ref 4–11)

## 2023-09-20 PROCEDURE — 74177 CT ABD & PELVIS W/CONTRAST: CPT | Performed by: EMERGENCY MEDICINE

## 2023-09-20 PROCEDURE — 99223 1ST HOSP IP/OBS HIGH 75: CPT | Performed by: INTERNAL MEDICINE

## 2023-09-20 PROCEDURE — 99223 1ST HOSP IP/OBS HIGH 75: CPT | Performed by: STUDENT IN AN ORGANIZED HEALTH CARE EDUCATION/TRAINING PROGRAM

## 2023-09-20 PROCEDURE — 76700 US EXAM ABDOM COMPLETE: CPT | Performed by: EMERGENCY MEDICINE

## 2023-09-20 RX ORDER — ONDANSETRON 2 MG/ML
4 INJECTION INTRAMUSCULAR; INTRAVENOUS EVERY 4 HOURS PRN
Status: DISCONTINUED | OUTPATIENT
Start: 2023-09-20 | End: 2023-09-21

## 2023-09-20 RX ORDER — HYDROMORPHONE HYDROCHLORIDE 1 MG/ML
0.5 INJECTION, SOLUTION INTRAMUSCULAR; INTRAVENOUS; SUBCUTANEOUS EVERY 30 MIN PRN
Status: DISCONTINUED | OUTPATIENT
Start: 2023-09-20 | End: 2023-09-21

## 2023-09-20 NOTE — TELEPHONE ENCOUNTER
S/w patient's wife about concerns that patient's gallbladder may need to be removed, pt is a resident of a nursing home where he has recently had an [address] of his gallbladder d/t  right abdominal pain the past few days (awaiting results). Pt previously seen by Dr Hola Castro in June of 2023. Advised wife that if it is deemed urgent patient should go to the ER to be evaluated. Wife to call office if surgery will need to be scheduled as an outpatient. Verbalized understanding.

## 2023-09-20 NOTE — ED INITIAL ASSESSMENT (HPI)
Per pt, mid abdominal pain to the R of his bellybutton. +n/v last night. Denies any diarrhea or  constipation.  LBW yesterday,

## 2023-09-20 NOTE — TELEPHONE ENCOUNTER
The patient's wife called in stating that the the nursing home that the patient stays in did a ultrasound on the patient. The nursing home stated that the results might show gallstones but they have not received the results back yet. The patient's wife is calling because she want to talk to either the doctor or nurse about the patient's gallbladder.     Call back # [phone]

## 2023-09-21 ENCOUNTER — APPOINTMENT (OUTPATIENT)
Dept: INTERVENTIONAL RADIOLOGY/VASCULAR | Facility: HOSPITAL | Age: 63
End: 2023-09-21
Attending: STUDENT IN AN ORGANIZED HEALTH CARE EDUCATION/TRAINING PROGRAM
Payer: COMMERCIAL

## 2023-09-21 LAB
ALBUMIN SERPL-MCNC: 1.9 G/DL (ref 3.4–5)
ALBUMIN/GLOB SERPL: 0.5 {RATIO} (ref 1–2)
ALP LIVER SERPL-CCNC: 380 U/L
ALT SERPL-CCNC: 220 U/L
ANION GAP SERPL CALC-SCNC: 7 MMOL/L (ref 0–18)
AST SERPL-CCNC: 172 U/L (ref 15–37)
BASOPHILS # BLD AUTO: 0.01 X10(3) UL (ref 0–0.2)
BASOPHILS NFR BLD AUTO: 0.1 %
BILIRUB SERPL-MCNC: 0.9 MG/DL (ref 0.1–2)
BUN BLD-MCNC: 15 MG/DL (ref 7–18)
CALCIUM BLD-MCNC: 8.2 MG/DL (ref 8.5–10.1)
CHLORIDE SERPL-SCNC: 107 MMOL/L (ref 98–112)
CO2 SERPL-SCNC: 23 MMOL/L (ref 21–32)
CREAT BLD-MCNC: 0.46 MG/DL
EGFRCR SERPLBLD CKD-EPI 2021: 117 ML/MIN/1.73M2 (ref 60–?)
EOSINOPHIL # BLD AUTO: 0.06 X10(3) UL (ref 0–0.7)
EOSINOPHIL NFR BLD AUTO: 0.4 %
ERYTHROCYTE [DISTWIDTH] IN BLOOD BY AUTOMATED COUNT: 12.9 %
EST. AVERAGE GLUCOSE BLD GHB EST-MCNC: 100 MG/DL (ref 68–126)
GLOBULIN PLAS-MCNC: 3.8 G/DL (ref 2.8–4.4)
GLUCOSE BLD-MCNC: 104 MG/DL (ref 70–99)
GLUCOSE BLD-MCNC: 73 MG/DL (ref 70–99)
GLUCOSE BLD-MCNC: 76 MG/DL (ref 70–99)
GLUCOSE BLD-MCNC: 78 MG/DL (ref 70–99)
GLUCOSE BLD-MCNC: 80 MG/DL (ref 70–99)
GLUCOSE BLD-MCNC: 97 MG/DL (ref 70–99)
GLUCOSE BLD-MCNC: 97 MG/DL (ref 70–99)
HBA1C MFR BLD: 5.1 % (ref ?–5.7)
HCT VFR BLD AUTO: 34.4 %
HGB BLD-MCNC: 11.3 G/DL
IMM GRANULOCYTES # BLD AUTO: 0.07 X10(3) UL (ref 0–1)
IMM GRANULOCYTES NFR BLD: 0.5 %
LACTATE SERPL-SCNC: 1 MMOL/L (ref 0.4–2)
LYMPHOCYTES # BLD AUTO: 0.8 X10(3) UL (ref 1–4)
LYMPHOCYTES NFR BLD AUTO: 5.2 %
MCH RBC QN AUTO: 32.4 PG (ref 26–34)
MCHC RBC AUTO-ENTMCNC: 32.8 G/DL (ref 31–37)
MCV RBC AUTO: 98.6 FL
MONOCYTES # BLD AUTO: 1.22 X10(3) UL (ref 0.1–1)
MONOCYTES NFR BLD AUTO: 7.9 %
NEUTROPHILS # BLD AUTO: 13.27 X10 (3) UL (ref 1.5–7.7)
NEUTROPHILS # BLD AUTO: 13.27 X10(3) UL (ref 1.5–7.7)
NEUTROPHILS NFR BLD AUTO: 85.9 %
OSMOLALITY SERPL CALC.SUM OF ELEC: 284 MOSM/KG (ref 275–295)
PLATELET # BLD AUTO: 220 10(3)UL (ref 150–450)
POTASSIUM SERPL-SCNC: 3.3 MMOL/L (ref 3.5–5.1)
PROT SERPL-MCNC: 5.7 G/DL (ref 6.4–8.2)
RBC # BLD AUTO: 3.49 X10(6)UL
SODIUM SERPL-SCNC: 137 MMOL/L (ref 136–145)
WBC # BLD AUTO: 15.4 X10(3) UL (ref 4–11)

## 2023-09-21 PROCEDURE — 99232 SBSQ HOSP IP/OBS MODERATE 35: CPT | Performed by: STUDENT IN AN ORGANIZED HEALTH CARE EDUCATION/TRAINING PROGRAM

## 2023-09-21 PROCEDURE — 0F9430Z DRAINAGE OF GALLBLADDER WITH DRAINAGE DEVICE, PERCUTANEOUS APPROACH: ICD-10-PCS | Performed by: RADIOLOGY

## 2023-09-21 PROCEDURE — 99232 SBSQ HOSP IP/OBS MODERATE 35: CPT | Performed by: INTERNAL MEDICINE

## 2023-09-21 RX ORDER — HYDROMORPHONE HYDROCHLORIDE 1 MG/ML
0.4 INJECTION, SOLUTION INTRAMUSCULAR; INTRAVENOUS; SUBCUTANEOUS EVERY 2 HOUR PRN
Status: DISCONTINUED | OUTPATIENT
Start: 2023-09-21 | End: 2023-09-23

## 2023-09-21 RX ORDER — NICOTINE POLACRILEX 4 MG
30 LOZENGE BUCCAL
Status: DISCONTINUED | OUTPATIENT
Start: 2023-09-21 | End: 2023-09-23

## 2023-09-21 RX ORDER — AMLODIPINE BESYLATE 5 MG/1
10 TABLET ORAL DAILY
Status: DISCONTINUED | OUTPATIENT
Start: 2023-09-21 | End: 2023-09-23

## 2023-09-21 RX ORDER — MIDAZOLAM HYDROCHLORIDE 1 MG/ML
INJECTION INTRAMUSCULAR; INTRAVENOUS
Status: COMPLETED
Start: 2023-09-21 | End: 2023-09-21

## 2023-09-21 RX ORDER — ENEMA 19; 7 G/133ML; G/133ML
1 ENEMA RECTAL ONCE AS NEEDED
Status: DISCONTINUED | OUTPATIENT
Start: 2023-09-21 | End: 2023-09-23

## 2023-09-21 RX ORDER — SENNOSIDES 8.6 MG
17.2 TABLET ORAL NIGHTLY PRN
Status: DISCONTINUED | OUTPATIENT
Start: 2023-09-21 | End: 2023-09-23

## 2023-09-21 RX ORDER — BACLOFEN 10 MG/1
10 TABLET ORAL NIGHTLY
Status: DISCONTINUED | OUTPATIENT
Start: 2023-09-21 | End: 2023-09-23

## 2023-09-21 RX ORDER — PROCHLORPERAZINE EDISYLATE 5 MG/ML
5 INJECTION INTRAMUSCULAR; INTRAVENOUS EVERY 8 HOURS PRN
Status: DISCONTINUED | OUTPATIENT
Start: 2023-09-21 | End: 2023-09-23

## 2023-09-21 RX ORDER — POLYETHYLENE GLYCOL 3350 17 G/17G
17 POWDER, FOR SOLUTION ORAL DAILY PRN
Status: DISCONTINUED | OUTPATIENT
Start: 2023-09-21 | End: 2023-09-23

## 2023-09-21 RX ORDER — HYDROCODONE BITARTRATE AND ACETAMINOPHEN 5; 325 MG/1; MG/1
2 TABLET ORAL EVERY 4 HOURS PRN
Status: DISCONTINUED | OUTPATIENT
Start: 2023-09-21 | End: 2023-09-23

## 2023-09-21 RX ORDER — HYDROCODONE BITARTRATE AND ACETAMINOPHEN 5; 325 MG/1; MG/1
1 TABLET ORAL EVERY 4 HOURS PRN
Status: DISCONTINUED | OUTPATIENT
Start: 2023-09-21 | End: 2023-09-23

## 2023-09-21 RX ORDER — ACETAMINOPHEN 325 MG/1
650 TABLET ORAL EVERY 4 HOURS PRN
Status: DISCONTINUED | OUTPATIENT
Start: 2023-09-21 | End: 2023-09-23

## 2023-09-21 RX ORDER — BUPROPION HYDROCHLORIDE 75 MG/1
150 TABLET ORAL 2 TIMES DAILY
COMMUNITY

## 2023-09-21 RX ORDER — TAMSULOSIN HYDROCHLORIDE 0.4 MG/1
0.8 CAPSULE ORAL NIGHTLY
Status: DISCONTINUED | OUTPATIENT
Start: 2023-09-21 | End: 2023-09-23

## 2023-09-21 RX ORDER — PIPERACILLIN SODIUM, TAZOBACTAM SODIUM 3; .375 G/15ML; G/15ML
INJECTION, POWDER, LYOPHILIZED, FOR SOLUTION INTRAVENOUS
Status: COMPLETED
Start: 2023-09-21 | End: 2023-09-21

## 2023-09-21 RX ORDER — ENOXAPARIN SODIUM 100 MG/ML
40 INJECTION SUBCUTANEOUS NIGHTLY
Status: DISCONTINUED | OUTPATIENT
Start: 2023-09-21 | End: 2023-09-23

## 2023-09-21 RX ORDER — MELATONIN
3 NIGHTLY PRN
Status: DISCONTINUED | OUTPATIENT
Start: 2023-09-21 | End: 2023-09-23

## 2023-09-21 RX ORDER — HYDROMORPHONE HYDROCHLORIDE 1 MG/ML
0.8 INJECTION, SOLUTION INTRAMUSCULAR; INTRAVENOUS; SUBCUTANEOUS EVERY 2 HOUR PRN
Status: DISCONTINUED | OUTPATIENT
Start: 2023-09-21 | End: 2023-09-23

## 2023-09-21 RX ORDER — HYDROMORPHONE HYDROCHLORIDE 1 MG/ML
0.2 INJECTION, SOLUTION INTRAMUSCULAR; INTRAVENOUS; SUBCUTANEOUS EVERY 2 HOUR PRN
Status: DISCONTINUED | OUTPATIENT
Start: 2023-09-21 | End: 2023-09-23

## 2023-09-21 RX ORDER — DEXTROSE AND SODIUM CHLORIDE 5; .45 G/100ML; G/100ML
INJECTION, SOLUTION INTRAVENOUS CONTINUOUS
Status: DISCONTINUED | OUTPATIENT
Start: 2023-09-21 | End: 2023-09-23

## 2023-09-21 RX ORDER — ECHINACEA PURPUREA EXTRACT 125 MG
1 TABLET ORAL
Status: DISCONTINUED | OUTPATIENT
Start: 2023-09-21 | End: 2023-09-23

## 2023-09-21 RX ORDER — ATORVASTATIN CALCIUM 40 MG/1
80 TABLET, FILM COATED ORAL NIGHTLY
Status: DISCONTINUED | OUTPATIENT
Start: 2023-09-21 | End: 2023-09-23

## 2023-09-21 RX ORDER — NICOTINE POLACRILEX 4 MG
15 LOZENGE BUCCAL
Status: DISCONTINUED | OUTPATIENT
Start: 2023-09-21 | End: 2023-09-23

## 2023-09-21 RX ORDER — DEXTROSE MONOHYDRATE 25 G/50ML
50 INJECTION, SOLUTION INTRAVENOUS
Status: DISCONTINUED | OUTPATIENT
Start: 2023-09-21 | End: 2023-09-23

## 2023-09-21 RX ORDER — ONDANSETRON 2 MG/ML
4 INJECTION INTRAMUSCULAR; INTRAVENOUS EVERY 6 HOURS PRN
Status: DISCONTINUED | OUTPATIENT
Start: 2023-09-21 | End: 2023-09-23

## 2023-09-21 RX ORDER — SODIUM CHLORIDE 9 MG/ML
INJECTION, SOLUTION INTRAVENOUS CONTINUOUS
Status: ACTIVE | OUTPATIENT
Start: 2023-09-21 | End: 2023-09-22

## 2023-09-21 RX ORDER — ESCITALOPRAM OXALATE 5 MG/1
5 TABLET ORAL DAILY
Status: DISCONTINUED | OUTPATIENT
Start: 2023-09-21 | End: 2023-09-21

## 2023-09-21 RX ORDER — BISACODYL 10 MG
10 SUPPOSITORY, RECTAL RECTAL
Status: DISCONTINUED | OUTPATIENT
Start: 2023-09-21 | End: 2023-09-23

## 2023-09-21 RX ORDER — CYCLOBENZAPRINE HCL 5 MG
5 TABLET ORAL 3 TIMES DAILY PRN
Status: DISCONTINUED | OUTPATIENT
Start: 2023-09-21 | End: 2023-09-23

## 2023-09-21 RX ORDER — BENZONATATE 100 MG/1
200 CAPSULE ORAL 3 TIMES DAILY PRN
Status: DISCONTINUED | OUTPATIENT
Start: 2023-09-21 | End: 2023-09-23

## 2023-09-21 RX ORDER — BUPROPION HYDROCHLORIDE 75 MG/1
150 TABLET ORAL 2 TIMES DAILY
Status: DISCONTINUED | OUTPATIENT
Start: 2023-09-21 | End: 2023-09-23

## 2023-09-21 RX ORDER — SERTRALINE HYDROCHLORIDE 100 MG/1
100 TABLET, FILM COATED ORAL DAILY
Status: DISCONTINUED | OUTPATIENT
Start: 2023-09-21 | End: 2023-09-21

## 2023-09-21 RX ORDER — LIDOCAINE HYDROCHLORIDE 10 MG/ML
INJECTION, SOLUTION INFILTRATION; PERINEURAL
Status: COMPLETED
Start: 2023-09-21 | End: 2023-09-21

## 2023-09-21 NOTE — PROCEDURES
[facility]  Procedure Note    Dari Miguel Patient Status:  Inpatient    1960 MRN [id number]   [facility] 3SW-A Attending Dirk Chambers MD   [facility] Day # 1 PCP None Pcp     Procedure: Perc good tube placement    Pre-Procedure Diagnosis:  Cholecystitis    Post-Procedure Diagnosis: Same    Anesthesia:  Local and Sedation    Findings:  10f APD placed to GB using transhepatic approach    Specimens: 20 ml bile    Blood Loss:  Minimal    Tourniquet Time: None  Complications:  None  Drains:  As above    Secondary Diagnosis:  None    Debbie Karimi MD  2023

## 2023-09-21 NOTE — PHYSICAL THERAPY NOTE
PHYSICAL THERAPY EVALUATION - INPATIENT     Room Number: 356/356-A  Evaluation Date: 9/21/2023  Type of Evaluation: Initial  Physician Order: PT Eval and Treat    Presenting Problem: cholecystitis, sepsis  Co-Morbidities : CVA with L sided weakness, PEG tube, depression, DM, HTN, spasticity  Reason for Therapy: Mobility Dysfunction and Discharge Planning    History:   Patient is a 57year old male admitted on 9/20/2023 from SNF for right upper quadrant pain and vomiting. Pt diagnosed with acute on chronic cholecystitis. Pt is s/p percutaneous cholecystostomy tube placement. Previous admissions:   2/10-2/24/23 for CVA --> Dc'd to [facility] 2/24-3/22 --> Then to [facility] per son --> then to [facility] for LTC  2/6-2/9/23 for CVA --> Dc'd home    ASSESSMENT   Pt is a 57year old male admitted on 9/20/2023 for cholecystitis, sepsis and underwent percutaneous cholecystostomy tube placement. Functional outcome measures completed include [facility]. The AM-PAC '6-Clicks' Inpatient Basic Mobility Short Form was completed and this patient is demonstrating a Approx Degree of Impairment: 86.62%  degree of impairment in mobility. Research supports that patients with this level of impairment may benefit from return to LTC. PT Discharge Recommendations: Home      PLAN  Patient has been evaluated and presents with no skilled Physical Therapy needs at this time. Patient discharged from Physical Therapy services. Please re-order if a new functional limitation presents during this admission. GOALS  Patient was able to achieve the following goals . ..     Patient was able to transfer At previous, functional level   Patient able to ambulate on level surfaces Unable before admission         HOME SITUATION  Type of Home: Skilled nursing facility ([facility])   Home Layout: One level                Lives With: Staff 24 hours        Patient Regularly Uses: Glasses    Prior Level of [place]: Pt typically requires a bib lift for transfers and gets assist for all ADLs at [facility] as a LTC resident. SUBJECTIVE  Pt pleasant and cooperative      OBJECTIVE  Precautions: Bed/chair alarm;Drain(s)  Fall Risk: High fall risk    WEIGHT BEARING RESTRICTION  Weight Bearing Restriction: None                PAIN ASSESSMENT  Ratin          COGNITION  Pt is a poor historian but is pleasant and cooperative and follows one step commands    RANGE OF MOTION AND STRENGTH ASSESSMENT  Upper extremity ROM and strength are within functional limits except L side limited due to hx of CVA    Lower extremity ROM is within functional limits except L side limited due to hx of CVA    Lower extremity strength is within functional limits except L side limited due to hx of CVA      BALANCE  Static Sitting: Dependent  Dynamic Sitting: Dependent  Static Standing: Not tested  Dynamic Standing: Not tested    ADDITIONAL TESTS                                    ACTIVITY TOLERANCE   Pt tolerated activity well, denied dizziness                      O2 WALK       NEUROLOGICAL FINDINGS                        AM-PAC '6-Clicks' INPATIENT SHORT FORM - BASIC MOBILITY  How much difficulty does the patient currently have. .. Patient Difficulty: Turning over in bed (including adjusting bedclothes, sheets and blankets)?: A Lot   Patient Difficulty: Sitting down on and standing up from a chair with arms (e.g., wheelchair, bedside commode, etc.): Unable   Patient Difficulty: Moving from lying on back to sitting on the side of the bed?: A Lot   How much help from another person does the patient currently need. ..    Help from Another: Moving to and from a bed to a chair (including a wheelchair)?: Total   Help from Another: Need to walk in hospital room?: Total   Help from Another: Climbing 3-5 steps with a railing?: Total       AM-PAC Score:  Raw Score: 8   Approx Degree of Impairment: 86.62%   Standardized Score (AM-PAC Scale): 28.58   CMS Modifier (G-Code): CM    FUNCTIONAL ABILITY STATUS  Gait Assessment   Functional Mobility/Gait Assessment  Gait Assistance: Other (Comment) (pt does not ambulate at baseline)    Skilled Therapy Provided: Per RN okay to work with pt. Pt received in supine and was agreeable to PT session. Bed Mobility:  Rolling: max  Supine to sit: dependent   Sit to supine: dependent    Pt sat EOB with dependent assist for balance     Transfer Mobility:  Sit to stand: NT   Stand to sit: NT  Gait = NT    Pt requires a bib lift at baseline for transfers. Therapist's comments:Pt and son educated on role of therapy, goals for session, activity recommendation, and DC planning. Pt and son verbalized understanding and denied further questions or concerns. Exercise/Education Provided:  Bed mobility  Energy conservation  Functional activity tolerated  Transfer training    Patient End of Session: In bed;Needs met;RN aware of session/findings;Call light within reach; All patient questions and concerns addressed;SCDs in place; Alarm set; Family present; Discussed recommendations with /    Patient Evaluation Complexity Level:  History Moderate - 1 or 2 personal factors and/or co-morbidities   Examination of body systems Low - addressing 1-2 elements   Clinical Presentation Low - Stable   Clinical Decision Making Low Complexity       PT Session Time: 30 minutes

## 2023-09-21 NOTE — PLAN OF CARE
Patient A&Ox4, VSS on room air. PEG tube intact and functioning. Ying tube placed this morning, draining, see flowsheets. Denies pain or discomfort. Continue bowel rest, ok for ice chips. Potassium replaced per electrolyte protocol. IVF infusing per orders. Patient will return to NH on DC when medically cleared. POC discussed with patient.

## 2023-09-21 NOTE — PLAN OF CARE
NURSING ADMISSION NOTE      Patient admitted via Cart from ED. Oriented to room. Safety precautions initiated. Bed in low position. Call light in reach. Pt AOx4. VSS on RA. Tele. /IS. Voiding via external catheter. Pt denies pain at this time. Left sided residual weakness due to hx CVA. Weak dorsi and plantar flexion to right leg noted. Denies numbness or tingling. IV fluids infusing as ordered. IV abx. NPO, medications to be crushed and given via PEG tube. PEG tube to LUQ intact and functioning. Plan of care updated with pt. Will continue to monitor.

## 2023-09-21 NOTE — PROGRESS NOTES
Occupational Therapy    Orders received and chart review completed. Attempted to see Pt this morning for skilled OT eval. However, Pt off unit for procedure at time of attempt. Will f/u as able.

## 2023-09-21 NOTE — ED QUICK NOTES
Orders for admission, patient is aware of plan and ready to go upstairs. Any questions, please call ED RN Taran at extension [phone].      Patient Covid vaccination status: Unvaccinated     COVID Test Ordered in ED: Rapid SARS-CoV-2 by PCR    COVID Suspicion at Admission: N/A    Running Infusions:      Mental Status/LOC at time of transport: a&ox4    Other pertinent information:   CIWA score: N/A   NIH score:  N/A

## 2023-09-21 NOTE — PROGRESS NOTES
[facility] Pharmacy Note:  Renal Adjustment for ampicillin/sulbactam (UNASYN)    Cassy Nino is a 57year old patient who has been prescribed ampicillin/sulbactam (UNASYN) 1.5 g every 6 hrs. The estimated creatinine clearance is 135.2 mL/min (A) (based on SCr of 0.51 mg/dL (L)). The dose has been adjusted to ampicillin/sulbactam (UNASYN) 3 g every 6 hrs per hospital renal dose adjustment protocol. Pharmacy will follow and adjust dose as warranted for additional renal function changes.     Thank you,    Geoff Doshi, PharmD  9/21/2023  1:23 AM

## 2023-09-21 NOTE — CM/SW NOTE
Department  notified of request for [facility] referrals started. Assigned CM/SW to follow up with pt/family on further discharge planning.      Steph Cooney  [facility]

## 2023-09-21 NOTE — CM/SW NOTE
09/21/23 1400   CM/SW Referral Data   Referral Source Social Work (self-referral)   Reason for Referral Discharge planning   Informant Patient;Son;EMR;Clinical Staff Member   Patient Info   Patient's Current Mental Status at Time of Assessment Alert;Oriented;Memory Impairments   [facility] Acute [facility] Provider Upon Admission [facility] AT [facility]   Discharge Needs   Anticipated D/C needs Long term care facility;Transportation services   Choice of Post-Acute Provider   Informed patient of right to choose their preferred provider Yes   List of appropriate post-acute services provided to patient/family with quality data No - Declined list   Patient/family choice [facility]   Information given to Patient; Son         Patient is a 58 y/o man admitted from [facility] with biliary colic. Pt s/p IR drain placement this AM.  Updates sent to NH facility via [address] by [facility]. Confirmation received from [facility] that pt is their long term care resident and can be accepted for readmission at discharge. Met with pt and pt's son at bedside to discuss DC planning. Pt/son confirm plan for return to NH at discharge. Pt's son stated pt's wife is his HCPOA and should be contacted for consents and at DC. He asked if MD could contact pt's wife regarding current medical plan. Message sent to MD with request to contact pt's wife. Noted POLST form sent from NH not on file. Form emailed to [employer] to have added to pt's chart. / to remain available for support and/or discharge planning.      [facility] [place] [address], [place]  Discharge Planner  [phone]

## 2023-09-22 LAB
ALBUMIN SERPL-MCNC: 1.8 G/DL (ref 3.4–5)
ALBUMIN/GLOB SERPL: 0.6 {RATIO} (ref 1–2)
ALP LIVER SERPL-CCNC: 386 U/L
ALT SERPL-CCNC: 191 U/L
ANION GAP SERPL CALC-SCNC: 4 MMOL/L (ref 0–18)
AST SERPL-CCNC: 165 U/L (ref 15–37)
BASOPHILS # BLD AUTO: 0.03 X10(3) UL (ref 0–0.2)
BASOPHILS NFR BLD AUTO: 0.3 %
BILIRUB SERPL-MCNC: 0.5 MG/DL (ref 0.1–2)
BUN BLD-MCNC: 11 MG/DL (ref 7–18)
CALCIUM BLD-MCNC: 8.1 MG/DL (ref 8.5–10.1)
CHLORIDE SERPL-SCNC: 111 MMOL/L (ref 98–112)
CO2 SERPL-SCNC: 25 MMOL/L (ref 21–32)
CREAT BLD-MCNC: 0.45 MG/DL
EGFRCR SERPLBLD CKD-EPI 2021: 118 ML/MIN/1.73M2 (ref 60–?)
EOSINOPHIL # BLD AUTO: 0.31 X10(3) UL (ref 0–0.7)
EOSINOPHIL NFR BLD AUTO: 3.4 %
ERYTHROCYTE [DISTWIDTH] IN BLOOD BY AUTOMATED COUNT: 13.2 %
GLOBULIN PLAS-MCNC: 3.1 G/DL (ref 2.8–4.4)
GLUCOSE BLD-MCNC: 102 MG/DL (ref 70–99)
GLUCOSE BLD-MCNC: 112 MG/DL (ref 70–99)
GLUCOSE BLD-MCNC: 122 MG/DL (ref 70–99)
GLUCOSE BLD-MCNC: 124 MG/DL (ref 70–99)
GLUCOSE BLD-MCNC: 94 MG/DL (ref 70–99)
GLUCOSE BLD-MCNC: 97 MG/DL (ref 70–99)
HCT VFR BLD AUTO: 33.9 %
HGB BLD-MCNC: 11.1 G/DL
IMM GRANULOCYTES # BLD AUTO: 0.05 X10(3) UL (ref 0–1)
IMM GRANULOCYTES NFR BLD: 0.6 %
LYMPHOCYTES # BLD AUTO: 1.21 X10(3) UL (ref 1–4)
LYMPHOCYTES NFR BLD AUTO: 13.4 %
MCH RBC QN AUTO: 31.9 PG (ref 26–34)
MCHC RBC AUTO-ENTMCNC: 32.7 G/DL (ref 31–37)
MCV RBC AUTO: 97.4 FL
MONOCYTES # BLD AUTO: 0.74 X10(3) UL (ref 0.1–1)
MONOCYTES NFR BLD AUTO: 8.2 %
NEUTROPHILS # BLD AUTO: 6.71 X10 (3) UL (ref 1.5–7.7)
NEUTROPHILS # BLD AUTO: 6.71 X10(3) UL (ref 1.5–7.7)
NEUTROPHILS NFR BLD AUTO: 74.1 %
OSMOLALITY SERPL CALC.SUM OF ELEC: 289 MOSM/KG (ref 275–295)
PLATELET # BLD AUTO: 224 10(3)UL (ref 150–450)
POTASSIUM SERPL-SCNC: 3.2 MMOL/L (ref 3.5–5.1)
POTASSIUM SERPL-SCNC: 3.2 MMOL/L (ref 3.5–5.1)
PROT SERPL-MCNC: 4.9 G/DL (ref 6.4–8.2)
RBC # BLD AUTO: 3.48 X10(6)UL
SODIUM SERPL-SCNC: 140 MMOL/L (ref 136–145)
WBC # BLD AUTO: 9.1 X10(3) UL (ref 4–11)

## 2023-09-22 PROCEDURE — 99232 SBSQ HOSP IP/OBS MODERATE 35: CPT | Performed by: STUDENT IN AN ORGANIZED HEALTH CARE EDUCATION/TRAINING PROGRAM

## 2023-09-22 PROCEDURE — 99232 SBSQ HOSP IP/OBS MODERATE 35: CPT | Performed by: INTERNAL MEDICINE

## 2023-09-22 RX ORDER — SODIUM BICARBONATE 325 MG/1
325 TABLET ORAL AS NEEDED
Status: DISCONTINUED | OUTPATIENT
Start: 2023-09-22 | End: 2023-09-23

## 2023-09-22 NOTE — PROGRESS NOTES
[facility]  Progress Note      Rosa Soler Patient Status:  Inpatient    1960 MRN [id number]   [facility] 3SW-A Attending Va Ruiz MD   Hosp Day # 2 PCP None Pcp       57year-old male with acute cholecystitis s/p cholecystostomy tube     - Tube functioning properly  - Continue current management  - Tube should remain in place for minimum 6 weeks unless cholecystectomy occurs prior to 6 week lala.     Jey Hearn MD  2023  10:00 AM

## 2023-09-22 NOTE — SLP NOTE
ADULT SWALLOWING EVALUATION    ASSESSMENT    ASSESSMENT/OVERALL IMPRESSION:  Patient seen for swallowing evaluation per protocol as patient with history of modified diet consistency. Patient alert and up in bed. He is currently admitted due to abnormal labs. Patient found to have cholecystitis. He underwent percutaneous cholecystostomy tube 9/21/23. He has a history of stroke earlier this year and his intake has been poor since. He has a PEG tube for nutrition. He eats very little per wife at bedside. Patient reports food does not taste good since stroke. He also dislikes the appearance of mechanical soft solids. Patient wife did state that the patient still pockets po on his left. He has previously eaten ribs and chicken wings per wife report and with good overt tolerance. She reports he will ask for certain foods and then will refuse them when she brings them in. Oral mechanism exam remarkable for reduced left facial tone. Speech is 100% intelligible. Patient accepted ice chips, puree, thin liquid by straw, and solid trials. Intact oral retrieval and containment. Oral prep and transit adequate. Mastication of solids WFL with complete oral clearance. No oral residue noted. Laryngeal excursion suspected to be mildly reduced to palpation. No overt signs of mastication noted. Discussed above with patient and wife at bedside. Patient presents with grossly functional oropharyngeal swallow . Concern for reduced awareness of left pocketing and associated choking risk. Discussed recommendation for regular consistency solids and thin liquids but with 1:1 supervision to assess for and clear any oral residue as needed. Patient and wife in agreement. Updated RN. RECOMMENDATIONS   Diet Recommendations - Solids: Regular (1:1 supervision to monitor for left pocketing)  Diet Recommendations - Liquids: Thin Liquids                        Compensatory Strategies Recommended: Slow rate; Alternate consistencies (direct supervision for left pocketing)  Aspiration Precautions: Upright position; Slow rate;Small bites and sips  Medication Administration Recommendations: Non-oral (via PEG)  Treatment Plan/Recommendations: Dysphagia therapy  Discharge Recommendations/Plan: Undetermined    HISTORY   MEDICAL HISTORY  Reason for Referral: Altered diet consistency    Problem List  Principal Problem:    Biliary colic  Active Problems:    Diabetes mellitus ([address]    Leukocytosis    Azotemia    Hypokalemia    Acute cholecystitis      Past Medical History  Past Medical History:   Diagnosis Date    Acute, but ill-defined, cerebrovascular disease 2/6/23    Arthritis     Dementia ([address]     Diabetes ([address]     Diabetes mellitus ([address]     Essential hypertension     Heartburn     High blood pressure     High cholesterol     History of depression     Indigestion     Loss of appetite     Nausea     Osteoarthritis     bilateral knees    Problems with swallowing     Stool incontinence     Stroke [address] [address]     stroke 2/2023 with left sided weakness    Visual impairment     glasses    Vomiting     Weight loss        Prior Living Situation: Skilled nursing facility  Diet Prior to Admission: Mechanical soft ground/ Minced & Moist;Thin liquids (per wife report, minimal po intake, PEG dependent)  Precautions: Aspiration    Patient/Family Goals: to eat by mouth    SWALLOWING HISTORY  Current Diet Consistency: NPO  Dysphagia History: as above  Imaging Results:   CT Abdomen + Pelvis from 9/20/23 revealed:  CONCLUSION:       1. Small bilateral pleural effusions with atelectasis. 2. Distended gallbladder with carpal row thickening and minimal pericholecystic fluid is noted. Possibility of cholecystitis is of consideration. The minimal pericholecystic fluid may be related to generalized minimal ascites as well. A follow-up   ultrasound and/or HIDA scan may be done for further evaluation. 3. G-tube in the stomach is noted.           LOCATION:  THE [facility] Dictated by (CST): Spring Hector MD on 9/20/2023 at 5:58 PM       Finalized by (CST): Spring Hector MD on 9/20/2023 at 6:01 PM     SUBJECTIVE       OBJECTIVE   ORAL MOTOR EXAMINATION  Dentition: Functional  Symmetry: Reduced left facial  Strength: Within Functional Limits  Tone: Reduced left facial  Range of Motion: Reduced left facial  Rate of Motion: Reduced    Voice Quality: Clear  Respiratory Status: Unlabored  Consistencies Trialed: Thin liquids;Puree;Hard solid  Method of Presentation: Self presentation;Staff/Clinician assistance;Straw;Single sips  Patient Positioning: Upright;Midline (in bed)    Oral Phase of Swallow: Within Functional Limits                      Pharyngeal Phase of Swallow: Within Functional Limits           (Please note: Silent aspiration cannot be evaluated clinically. Videofluoroscopic Swallow Study is required to rule-out silent aspiration.)    Esophageal Phase of Swallow: No complaints consistent with possible esophageal involvement              GOALS  Goal #1 The patient will tolerate regular consistency and thin liquids without overt signs or symptoms of aspiration with 100 % accuracy over 2-3 session(s). In Progress   Goal #2 The patient/family/caregiver will demonstrate understanding and implementation of aspiration precautions and swallow strategies independently over 2-3 session(s).     In Progress     FOLLOW UP  Treatment Plan/Recommendations: Dysphagia therapy  Number of Visits to Meet Established Goals: 2  Follow Up Needed (Documentation Required): Yes  SLP Follow-up Date: 09/25/23    Thank you for your referral.   If you have any questions, please contact Dolores Chaparro   Pager [phone]

## 2023-09-22 NOTE — PLAN OF CARE
Pt AOx4. VSS on RA. Tele. . Voiding via external catheter. Pt denies pain at this time. Left sided residual weakness due to hx CVA. Weak dorsi and plantar flexion to right leg noted. Denies numbness or tingling. IV fluids infusing as ordered. IV abx. Medications to be crushed and given via PEG tube to LUQ, intact and functioning. Percutaneous good tube intact with output of 125 ml at 2200. Plan is to continue bowel rest, discharge back to [facility] when medically cleared. Will continue to monitor.

## 2023-09-22 NOTE — CM/SW NOTE
Received voicemail from Aaron Montoya, RN/CM with [employer]. She is available to assist with any discharge needs: [phone]. Plan for pt to return to [address] at discharge. / to remain available for support and/or discharge planning.      Cami Renteria LCSW  Discharge Planner  [phone]

## 2023-09-22 NOTE — PLAN OF CARE
Pt seen by gen surg this am.  Denies abdo pain. Ying tube and PEG in place. Ying tube flushed as per order, draining purulent, serosang fluid. Potassium replaced per protocol. Left side weakness 2nd to CVA. Pt responds appropriately to questions. Will restart feeding when seen by dietician. Receiving IV Unasyn as ordered and IVF's. Voiding per external cath. Repositioned [id number], skin care given. No breakdown noted. Pt to return to [facility] when ready. Pt updated on POC.

## 2023-09-22 NOTE — PROGRESS NOTES
Assumed care for patient at 1225. Patient resting in bed at this time. No c/o pain at this time. Peg tube in place. Ying tube in place. Update:  Patient alert and oriented x4. VSS on RA. Tele in NSR. Denies pain. Denies n/t. Left SCD in place, patient refusing right SCD and reports wanting to keep brace in place. Ankle pumps encouraged. IS encouraged. PEG tube in place. Tube feedings started this afternoon, running at ordered rate. Patient tolerating tube feeds at this time. Ying drain in place, dressing is CDI and draining properly. Voiding per external cath. Repositioned q 2 hours. IV antibiotics per order. IVF per order.      Plan: discharge back to [facility] when medically cleared

## 2023-09-23 VITALS
RESPIRATION RATE: 16 BRPM | TEMPERATURE: 98 F | HEIGHT: 70 IN | DIASTOLIC BLOOD PRESSURE: 85 MMHG | BODY MASS INDEX: 20.62 KG/M2 | WEIGHT: 144 LBS | OXYGEN SATURATION: 95 % | SYSTOLIC BLOOD PRESSURE: 125 MMHG | HEART RATE: 82 BPM

## 2023-09-23 LAB
ALBUMIN SERPL-MCNC: 1.7 G/DL (ref 3.4–5)
ALBUMIN/GLOB SERPL: 0.5 {RATIO} (ref 1–2)
ALP LIVER SERPL-CCNC: 517 U/L
ALT SERPL-CCNC: 325 U/L
ANION GAP SERPL CALC-SCNC: 6 MMOL/L (ref 0–18)
AST SERPL-CCNC: 310 U/L (ref 15–37)
BILIRUB SERPL-MCNC: 0.4 MG/DL (ref 0.1–2)
BUN BLD-MCNC: 8 MG/DL (ref 7–18)
CALCIUM BLD-MCNC: 8.1 MG/DL (ref 8.5–10.1)
CHLORIDE SERPL-SCNC: 108 MMOL/L (ref 98–112)
CO2 SERPL-SCNC: 25 MMOL/L (ref 21–32)
CREAT BLD-MCNC: 0.36 MG/DL
EGFRCR SERPLBLD CKD-EPI 2021: 126 ML/MIN/1.73M2 (ref 60–?)
GLOBULIN PLAS-MCNC: 3.5 G/DL (ref 2.8–4.4)
GLUCOSE BLD-MCNC: 120 MG/DL (ref 70–99)
GLUCOSE BLD-MCNC: 156 MG/DL (ref 70–99)
GLUCOSE BLD-MCNC: 162 MG/DL (ref 70–99)
GLUCOSE BLD-MCNC: 190 MG/DL (ref 70–99)
OSMOLALITY SERPL CALC.SUM OF ELEC: 290 MOSM/KG (ref 275–295)
POTASSIUM SERPL-SCNC: 3.3 MMOL/L (ref 3.5–5.1)
POTASSIUM SERPL-SCNC: 3.5 MMOL/L (ref 3.5–5.1)
PROT SERPL-MCNC: 5.2 G/DL (ref 6.4–8.2)
SODIUM SERPL-SCNC: 139 MMOL/L (ref 136–145)

## 2023-09-23 PROCEDURE — 99232 SBSQ HOSP IP/OBS MODERATE 35: CPT | Performed by: SURGERY

## 2023-09-23 PROCEDURE — 99239 HOSP IP/OBS DSCHRG MGMT >30: CPT | Performed by: INTERNAL MEDICINE

## 2023-09-23 RX ORDER — AMOXICILLIN AND CLAVULANATE POTASSIUM 875; 125 MG/1; MG/1
1 TABLET, FILM COATED ORAL 2 TIMES DAILY
Qty: 6 TABLET | Refills: 0 | Status: SHIPPED | OUTPATIENT
Start: 2023-09-23 | End: 2023-09-23

## 2023-09-23 RX ORDER — HYDROCODONE BITARTRATE AND ACETAMINOPHEN 5; 325 MG/1; MG/1
1 TABLET ORAL EVERY 4 HOURS PRN
Qty: 10 TABLET | Refills: 0 | Status: SHIPPED | OUTPATIENT
Start: 2023-09-23

## 2023-09-23 RX ORDER — AMOXICILLIN AND CLAVULANATE POTASSIUM 875; 125 MG/1; MG/1
1 TABLET, FILM COATED ORAL 2 TIMES DAILY
Qty: 10 TABLET | Refills: 0 | Status: SHIPPED | OUTPATIENT
Start: 2023-09-23 | End: 2023-09-28

## 2023-09-23 RX ORDER — POTASSIUM CHLORIDE 20 MEQ/1
40 TABLET, EXTENDED RELEASE ORAL ONCE
Status: COMPLETED | OUTPATIENT
Start: 2023-09-23 | End: 2023-09-23

## 2023-09-23 NOTE — PLAN OF CARE
A&Ox4. VSS. On room air. Left sided residual weakness d/t hx of CVA. PEG tube in place to LUQ of abdomen, intact and functioning - tube feeding continued at ordered rate. Ying tube patent, flushed per order. Last BM 9/22. Voiding via male external cath. Denies pain. Plan is to dc back to [facility] when cleared. Patient updated and in agreement with plan of care. Safety precautions in place. Instructed patient to call for assistance, call light within reach.

## 2023-09-23 NOTE — PROGRESS NOTES
Patient cleared by all MD's for discharge. IV removed. Norco script, richi paperwork, and belongings sent with transport. Patient discharging to [facility] via ambulance.

## 2023-09-23 NOTE — PROGRESS NOTES
[facility]  Progress Note      Yemi Guerrero Patient Status:  Inpatient    1960 MRN [id number]   [facility] 3SW-A Attending Terri Bowen MD   Hosp Day # 3 PCP None Pcp     57year-old male with acute cholecystitis s/p cholecystostomy tube      - Tube functioning properly  - Continue current management  - Tube should remain in place for minimum 6 weeks unless cholecystectomy occurs prior to 6 week lala.     Gene Bhat MD  2023  11:01 AM

## 2023-09-23 NOTE — CM/SW NOTE
09/23/23 1303   Discharge disposition   Expected discharge disposition subacute   Post Acute Care Provider Dylan Ferrer   Discharge transportation [facility] for patient discharge to [facility] via [address] at 5 pm, PCS completed for transport. RN to call facility at [phone] for transfer report. [facility]  [address] [facility], [address] [place] [address] Street  Phone: [phone], RN Case Manager [phone]     Update:  [phone]: CM received call from patient's spouse Jean Claude Rasmussen for request to use [facility] transport for discharge; Shannon stated [address] is not in network with [facility]. CM called [facility] to request discharge transportation to facility today at 5pm.  [address] cancelled.

## 2023-09-23 NOTE — PLAN OF CARE
Received patient awake and oriented, slow to respond. PEG tube feeding of Osmolyte 1.5 at 55 ml/h infusingwith q 4h water flush. On room air, breath sounds clear. On tele, SR. Patient seen patting R abdomen at site of good drain. Stated a lot of pain there and medicated with Norco. Drain flushed and aspirated with serosanguinous drainage in bag. Voiding per primiofit, urine yellow in color.

## 2023-09-23 NOTE — DISCHARGE INSTRUCTIONS
TUBE FEEDING ORDER WHILE IN HOSPITAL    Tube Feeding Formula Osmolite 1.5   Tube Feeding Method: Continuous   Tube Feeding Instructions Osm 1.5 @ 55ml/hr x 20 (hold for prevacid + flomax).  OK to start at goal   water flush 145   flush frequency Every 4 hours   Delivery Site Route: Gastric   Enteral Access Device: G-Tube       DRAIN ORDER     Drain type: Cholecystostomy tube    Drain/Tube function: Irrigate    Irrigation frequency: Q day (24 hours) and then manually aspirate   Amount/Solution: 10 cc Normal Saline

## 2023-11-02 ENCOUNTER — OFFICE VISIT (OUTPATIENT)
Facility: LOCATION | Age: 63
End: 2023-11-02
Payer: COMMERCIAL

## 2023-11-02 VITALS — TEMPERATURE: 98 F | OXYGEN SATURATION: 99 % | HEART RATE: 106 BPM

## 2023-11-02 DIAGNOSIS — K81.9 CHOLECYSTITIS: Primary | ICD-10-CM

## 2023-11-02 PROCEDURE — 99214 OFFICE O/P EST MOD 30 MIN: CPT | Performed by: STUDENT IN AN ORGANIZED HEALTH CARE EDUCATION/TRAINING PROGRAM

## 2023-11-17 RX ORDER — VANCOMYCIN HYDROCHLORIDE 50 MG/ML
250 KIT ORAL 4 TIMES DAILY
Status: ON HOLD | COMMUNITY
Start: 2023-10-15 | End: 2023-12-15

## 2023-11-17 RX ORDER — PANTOPRAZOLE SODIUM 40 MG/1
40 TABLET, DELAYED RELEASE ORAL
COMMUNITY
End: 2023-12-20

## 2023-11-17 RX ORDER — MIRTAZAPINE 15 MG/1
15 TABLET, FILM COATED ORAL NIGHTLY
COMMUNITY

## 2023-11-17 NOTE — PAT NURSING NOTE
Upon screening patient for upcoming surgery, Graciela Walter RN at [facility] states that patient + c, diff on 11/9/23. Patient is currently on vancomycin 250 mg per g- tube every 6 hours until 11/19/23. Infection Control, RN Marianne NOVOA, notified.

## 2023-11-30 ENCOUNTER — TELEPHONE (OUTPATIENT)
Facility: LOCATION | Age: 63
End: 2023-11-30

## 2023-11-30 NOTE — TELEPHONE ENCOUNTER
Transaction ID: [id number] ID: [id number] Date: 2023-11-30  Noamie Limb Patient  Member ID  [id number]    Date of Birth  [birth year]    Gender  Male    Relationship to  [address] Name  Nick Liz    Eligibility Status  Active Coverage    Group Number  [id number]    Plan / Coverage Date  2023-04-01    Transaction Type  Outpatient Authorization    Organization  [address] ([address])    [facility] [employer] logo     Certificate Information  Reference Number  NA    Status  NO ACTION REQUIRED    Member Information  Patient Name  Naomie Limb    Patient Date of Birth  1978-66-06    Patient Gender  Male    Member ID  [id number]    Relationship to [address]  Other Relationship    Subscriber Name  Nick Liz    Requesting Provider     Name  Vipin Hackett  [id number]    Provider Role  Provider    Address  [facility] [facility], [address]    Phone  [phone]  Contact Name  [address] [place] [address]    Diagnosis Code 1  J666 - Cholecystitis unspecified    Procedure Code 1 (CPT/HCPCS)  20180 - LAPARO CHOLECYSTECTOMY/GRAPH    Quantity  1 Units    Procedure From - To Date  2023-12-15    Status  NO ACTION REQUIRED    Message  NO PRECERT REQUIRED PLEASE REFER TO THE PROVIDER CODE SEARCH TOOL ON [employer] WEBSITE THE REQUESTED SERVICE MAY NOT BE ELIGIBLE FOR COVERAGE REFER TO [address] [place] [address] [address]    Rendering Provider/Facility     Provider 1  Name  Vipin Hackett  [id number]    Provider Role  Attending    Provider 2  Name  [facility] JESICA MOCTEZUMA  [id number]    Provider Role  Facility

## 2023-12-11 ENCOUNTER — TELEPHONE (OUTPATIENT)
Facility: LOCATION | Age: 63
End: 2023-12-11

## 2023-12-11 NOTE — TELEPHONE ENCOUNTER
Patient's wife is calling to ask if we received a release from his nursing home.     Please advise  Best callback number is [phone]

## 2023-12-13 ENCOUNTER — TELEPHONE (OUTPATIENT)
Facility: LOCATION | Age: 63
End: 2023-12-13

## 2023-12-13 DIAGNOSIS — K81.9 CHOLECYSTITIS: Primary | ICD-10-CM

## 2023-12-14 ENCOUNTER — ANESTHESIA EVENT (OUTPATIENT)
Dept: SURGERY | Facility: HOSPITAL | Age: 63
End: 2023-12-14
Payer: COMMERCIAL

## 2023-12-15 ENCOUNTER — HOSPITAL ENCOUNTER (INPATIENT)
Facility: HOSPITAL | Age: 63
LOS: 5 days | Discharge: NURSING, REHAB OR LONG-TERM CARE | DRG: 409 | End: 2023-12-20
Attending: STUDENT IN AN ORGANIZED HEALTH CARE EDUCATION/TRAINING PROGRAM | Admitting: STUDENT IN AN ORGANIZED HEALTH CARE EDUCATION/TRAINING PROGRAM
Payer: COMMERCIAL

## 2023-12-15 ENCOUNTER — APPOINTMENT (OUTPATIENT)
Dept: GENERAL RADIOLOGY | Facility: HOSPITAL | Age: 63
End: 2023-12-15
Attending: STUDENT IN AN ORGANIZED HEALTH CARE EDUCATION/TRAINING PROGRAM
Payer: COMMERCIAL

## 2023-12-15 ENCOUNTER — HOSPITAL ENCOUNTER (INPATIENT)
Facility: HOSPITAL | Age: 63
LOS: 5 days | Discharge: HOME | End: 2023-12-20
Attending: STUDENT IN AN ORGANIZED HEALTH CARE EDUCATION/TRAINING PROGRAM | Admitting: STUDENT IN AN ORGANIZED HEALTH CARE EDUCATION/TRAINING PROGRAM
Payer: COMMERCIAL

## 2023-12-15 ENCOUNTER — APPOINTMENT (OUTPATIENT)
Dept: GENERAL RADIOLOGY | Facility: HOSPITAL | Age: 63
DRG: 409 | End: 2023-12-15
Attending: STUDENT IN AN ORGANIZED HEALTH CARE EDUCATION/TRAINING PROGRAM
Payer: COMMERCIAL

## 2023-12-15 ENCOUNTER — ANESTHESIA (OUTPATIENT)
Dept: SURGERY | Facility: HOSPITAL | Age: 63
End: 2023-12-15
Payer: COMMERCIAL

## 2023-12-15 DIAGNOSIS — K81.9 CHOLECYSTITIS: ICD-10-CM

## 2023-12-15 DIAGNOSIS — Z01.818 PRE-OP TESTING: Primary | ICD-10-CM

## 2023-12-15 PROBLEM — K81.1 CHRONIC CHOLECYSTITIS: Status: ACTIVE | Noted: 2023-12-15

## 2023-12-15 LAB
ATRIAL RATE: 74 BPM
BASE EXCESS BLD CALC-SCNC: -3 MMOL/L
BASE EXCESS BLDA CALC-SCNC: -2 MMOL/L (ref ?–30)
CA-I BLD-SCNC: 1.17 MMOL/L (ref 1.12–1.32)
CA-I BLDA-SCNC: 1.19 MMOL/L (ref 1.12–1.32)
CO2 BLD-SCNC: 23 MMOL/L (ref 22–32)
CO2 BLDA-SCNC: 24 MMOL/L (ref 22–32)
GLUCOSE BLD-MCNC: 168 MG/DL (ref 70–99)
GLUCOSE BLD-MCNC: 188 MG/DL (ref 70–99)
GLUCOSE BLD-MCNC: 197 MG/DL (ref 70–99)
GLUCOSE BLD-MCNC: 81 MG/DL (ref 70–99)
GLUCOSE BLDA-MCNC: 204 MG/DL (ref 70–99)
HCO3 BLD-SCNC: 22.2 MEQ/L
HCO3 BLDA-SCNC: 23.1 MEQ/L (ref 22–26)
HCT VFR BLD CALC: 42 %
HCT VFR BLDA CALC: 40 %
P AXIS: 34 DEGREES
P-R INTERVAL: 152 MS
PCO2 BLD: 35.8 MMHG
PCO2 BLDA: 40.1 MMHG (ref 35–45)
PH BLD: 7.4 [PH]
PH BLDA: 7.37 [PH] (ref 7.35–7.45)
PO2 BLD: 319 MMHG
PO2 BLDA: 231 MMHG (ref 80–105)
POTASSIUM BLD-SCNC: 4 MMOL/L (ref 3.6–5.1)
Q-T INTERVAL: 420 MS
QRS DURATION: 106 MS
QTC CALCULATION (BEZET): 466 MS
R AXIS: -49 DEGREES
SAO2 % BLD: 100 %
SAO2 % BLDA: 100 % (ref 92–100)
SARS-COV-2 RNA RESP QL NAA+PROBE: NOT DETECTED
SODIUM BLD-SCNC: 135 MMOL/L (ref 136–145)
SODIUM BLDA-SCNC: 135 MMOL/L (ref 136–145)
SODIUM BLDA-SCNC: 4.1 MMOL/L (ref 3.6–5.1)
T AXIS: 31 DEGREES
VENTRICULAR RATE: 74 BPM

## 2023-12-15 PROCEDURE — 49905 OMENTAL FLAP INTRA-ABDOM: CPT | Performed by: STUDENT IN AN ORGANIZED HEALTH CARE EDUCATION/TRAINING PROGRAM

## 2023-12-15 PROCEDURE — 0FP440Z REMOVAL OF DRAINAGE DEVICE FROM GALLBLADDER, PERCUTANEOUS ENDOSCOPIC APPROACH: ICD-10-PCS | Performed by: STUDENT IN AN ORGANIZED HEALTH CARE EDUCATION/TRAINING PROGRAM

## 2023-12-15 PROCEDURE — 99223 1ST HOSP IP/OBS HIGH 75: CPT | Performed by: HOSPITALIST

## 2023-12-15 PROCEDURE — 0FU707Z SUPPLEMENT COMMON HEPATIC DUCT WITH AUTOLOGOUS TISSUE SUBSTITUTE, OPEN APPROACH: ICD-10-PCS | Performed by: SURGERY

## 2023-12-15 PROCEDURE — BF131ZZ FLUOROSCOPY OF GALLBLADDER AND BILE DUCTS USING LOW OSMOLAR CONTRAST: ICD-10-PCS | Performed by: STUDENT IN AN ORGANIZED HEALTH CARE EDUCATION/TRAINING PROGRAM

## 2023-12-15 PROCEDURE — 3008F BODY MASS INDEX DOCD: CPT | Performed by: STUDENT IN AN ORGANIZED HEALTH CARE EDUCATION/TRAINING PROGRAM

## 2023-12-15 PROCEDURE — 0FT40ZZ RESECTION OF GALLBLADDER, OPEN APPROACH: ICD-10-PCS | Performed by: STUDENT IN AN ORGANIZED HEALTH CARE EDUCATION/TRAINING PROGRAM

## 2023-12-15 PROCEDURE — 3074F SYST BP LT 130 MM HG: CPT | Performed by: STUDENT IN AN ORGANIZED HEALTH CARE EDUCATION/TRAINING PROGRAM

## 2023-12-15 PROCEDURE — 0FJ44ZZ INSPECTION OF GALLBLADDER, PERCUTANEOUS ENDOSCOPIC APPROACH: ICD-10-PCS | Performed by: STUDENT IN AN ORGANIZED HEALTH CARE EDUCATION/TRAINING PROGRAM

## 2023-12-15 PROCEDURE — 47605 CHOLECYSTECTOMY W/CHOLANG: CPT | Performed by: STUDENT IN AN ORGANIZED HEALTH CARE EDUCATION/TRAINING PROGRAM

## 2023-12-15 PROCEDURE — 47605 CHOLECYSTECTOMY W/CHOLANG: CPT

## 2023-12-15 PROCEDURE — 47900 SUTURE BILE DUCT INJURY: CPT | Performed by: STUDENT IN AN ORGANIZED HEALTH CARE EDUCATION/TRAINING PROGRAM

## 2023-12-15 PROCEDURE — 74300 X-RAY BILE DUCTS/PANCREAS: CPT | Performed by: STUDENT IN AN ORGANIZED HEALTH CARE EDUCATION/TRAINING PROGRAM

## 2023-12-15 PROCEDURE — 3078F DIAST BP <80 MM HG: CPT | Performed by: STUDENT IN AN ORGANIZED HEALTH CARE EDUCATION/TRAINING PROGRAM

## 2023-12-15 RX ORDER — ONDANSETRON 2 MG/ML
INJECTION INTRAMUSCULAR; INTRAVENOUS AS NEEDED
Status: DISCONTINUED | OUTPATIENT
Start: 2023-12-15 | End: 2023-12-15 | Stop reason: SURG

## 2023-12-15 RX ORDER — TAMSULOSIN HYDROCHLORIDE 0.4 MG/1
0.8 CAPSULE ORAL NIGHTLY
Status: DISCONTINUED | OUTPATIENT
Start: 2023-12-15 | End: 2023-12-20

## 2023-12-15 RX ORDER — MIDAZOLAM HYDROCHLORIDE 1 MG/ML
INJECTION INTRAMUSCULAR; INTRAVENOUS AS NEEDED
Status: DISCONTINUED | OUTPATIENT
Start: 2023-12-15 | End: 2023-12-15 | Stop reason: SURG

## 2023-12-15 RX ORDER — FAMOTIDINE 20 MG/1
20 TABLET, FILM COATED ORAL 2 TIMES DAILY
Status: DISCONTINUED | OUTPATIENT
Start: 2023-12-15 | End: 2023-12-15

## 2023-12-15 RX ORDER — NALOXONE HYDROCHLORIDE 0.4 MG/ML
80 INJECTION, SOLUTION INTRAMUSCULAR; INTRAVENOUS; SUBCUTANEOUS AS NEEDED
Status: ACTIVE | OUTPATIENT
Start: 2023-12-15 | End: 2023-12-15

## 2023-12-15 RX ORDER — DEXTROSE MONOHYDRATE 25 G/50ML
50 INJECTION, SOLUTION INTRAVENOUS
Status: DISCONTINUED | OUTPATIENT
Start: 2023-12-15 | End: 2023-12-20

## 2023-12-15 RX ORDER — DROPERIDOL 2.5 MG/ML
INJECTION, SOLUTION INTRAMUSCULAR; INTRAVENOUS AS NEEDED
Status: DISCONTINUED | OUTPATIENT
Start: 2023-12-15 | End: 2023-12-15 | Stop reason: SURG

## 2023-12-15 RX ORDER — PANTOPRAZOLE SODIUM 40 MG/1
40 TABLET, DELAYED RELEASE ORAL
Status: DISCONTINUED | OUTPATIENT
Start: 2023-12-15 | End: 2023-12-20

## 2023-12-15 RX ORDER — NICOTINE POLACRILEX 4 MG
30 LOZENGE BUCCAL
Status: DISCONTINUED | OUTPATIENT
Start: 2023-12-15 | End: 2023-12-20

## 2023-12-15 RX ORDER — OXYCODONE HYDROCHLORIDE 5 MG/1
5 TABLET ORAL EVERY 4 HOURS PRN
Status: DISCONTINUED | OUTPATIENT
Start: 2023-12-15 | End: 2023-12-20

## 2023-12-15 RX ORDER — ENOXAPARIN SODIUM 100 MG/ML
40 INJECTION SUBCUTANEOUS EVERY 24 HOURS
Status: DISCONTINUED | OUTPATIENT
Start: 2023-12-15 | End: 2023-12-20

## 2023-12-15 RX ORDER — HYDROMORPHONE HYDROCHLORIDE 1 MG/ML
0.4 INJECTION, SOLUTION INTRAMUSCULAR; INTRAVENOUS; SUBCUTANEOUS EVERY 5 MIN PRN
Status: ACTIVE | OUTPATIENT
Start: 2023-12-15 | End: 2023-12-15

## 2023-12-15 RX ORDER — ACETAMINOPHEN 325 MG/1
650 TABLET ORAL ONCE
Status: DISCONTINUED | OUTPATIENT
Start: 2023-12-15 | End: 2023-12-15 | Stop reason: HOSPADM

## 2023-12-15 RX ORDER — METOCLOPRAMIDE HYDROCHLORIDE 5 MG/ML
INJECTION INTRAMUSCULAR; INTRAVENOUS AS NEEDED
Status: DISCONTINUED | OUTPATIENT
Start: 2023-12-15 | End: 2023-12-15 | Stop reason: SURG

## 2023-12-15 RX ORDER — MEPERIDINE HYDROCHLORIDE 25 MG/ML
12.5 INJECTION INTRAMUSCULAR; INTRAVENOUS; SUBCUTANEOUS AS NEEDED
Status: DISCONTINUED | OUTPATIENT
Start: 2023-12-15 | End: 2023-12-15 | Stop reason: HOSPADM

## 2023-12-15 RX ORDER — SCOLOPAMINE TRANSDERMAL SYSTEM 1 MG/1
1 PATCH, EXTENDED RELEASE TRANSDERMAL ONCE
Status: DISCONTINUED | OUTPATIENT
Start: 2023-12-15 | End: 2023-12-20

## 2023-12-15 RX ORDER — METOCLOPRAMIDE HYDROCHLORIDE 5 MG/ML
10 INJECTION INTRAMUSCULAR; INTRAVENOUS EVERY 8 HOURS PRN
Status: DISCONTINUED | OUTPATIENT
Start: 2023-12-15 | End: 2023-12-20

## 2023-12-15 RX ORDER — OXYCODONE HYDROCHLORIDE 5 MG/1
2.5 TABLET ORAL EVERY 4 HOURS PRN
Status: DISCONTINUED | OUTPATIENT
Start: 2023-12-15 | End: 2023-12-20

## 2023-12-15 RX ORDER — LABETALOL HYDROCHLORIDE 5 MG/ML
5 INJECTION, SOLUTION INTRAVENOUS EVERY 5 MIN PRN
Status: ACTIVE | OUTPATIENT
Start: 2023-12-15 | End: 2023-12-15

## 2023-12-15 RX ORDER — ONDANSETRON 2 MG/ML
4 INJECTION INTRAMUSCULAR; INTRAVENOUS EVERY 6 HOURS PRN
Status: DISCONTINUED | OUTPATIENT
Start: 2023-12-15 | End: 2023-12-20

## 2023-12-15 RX ORDER — KETOROLAC TROMETHAMINE 30 MG/ML
INJECTION, SOLUTION INTRAMUSCULAR; INTRAVENOUS AS NEEDED
Status: DISCONTINUED | OUTPATIENT
Start: 2023-12-15 | End: 2023-12-15 | Stop reason: SURG

## 2023-12-15 RX ORDER — LIDOCAINE HYDROCHLORIDE 10 MG/ML
INJECTION, SOLUTION EPIDURAL; INFILTRATION; INTRACAUDAL; PERINEURAL AS NEEDED
Status: DISCONTINUED | OUTPATIENT
Start: 2023-12-15 | End: 2023-12-15 | Stop reason: SURG

## 2023-12-15 RX ORDER — CEFAZOLIN SODIUM/WATER 2 G/20 ML
2 SYRINGE (ML) INTRAVENOUS ONCE
Status: COMPLETED | OUTPATIENT
Start: 2023-12-15 | End: 2023-12-15

## 2023-12-15 RX ORDER — HYDROCODONE BITARTRATE AND ACETAMINOPHEN 5; 325 MG/1; MG/1
1 TABLET ORAL ONCE AS NEEDED
Status: DISCONTINUED | OUTPATIENT
Start: 2023-12-15 | End: 2023-12-15 | Stop reason: HOSPADM

## 2023-12-15 RX ORDER — HYDRALAZINE HYDROCHLORIDE 20 MG/ML
INJECTION INTRAMUSCULAR; INTRAVENOUS AS NEEDED
Status: DISCONTINUED | OUTPATIENT
Start: 2023-12-15 | End: 2023-12-15 | Stop reason: SURG

## 2023-12-15 RX ORDER — PHENYLEPHRINE HCL 10 MG/ML
VIAL (ML) INJECTION AS NEEDED
Status: DISCONTINUED | OUTPATIENT
Start: 2023-12-15 | End: 2023-12-15 | Stop reason: SURG

## 2023-12-15 RX ORDER — NICOTINE POLACRILEX 4 MG
15 LOZENGE BUCCAL
Status: DISCONTINUED | OUTPATIENT
Start: 2023-12-15 | End: 2023-12-20

## 2023-12-15 RX ORDER — ACETAMINOPHEN 10 MG/ML
INJECTION, SOLUTION INTRAVENOUS AS NEEDED
Status: DISCONTINUED | OUTPATIENT
Start: 2023-12-15 | End: 2023-12-15 | Stop reason: SURG

## 2023-12-15 RX ORDER — HYDROMORPHONE HYDROCHLORIDE 1 MG/ML
0.2 INJECTION, SOLUTION INTRAMUSCULAR; INTRAVENOUS; SUBCUTANEOUS EVERY 2 HOUR PRN
Status: DISCONTINUED | OUTPATIENT
Start: 2023-12-15 | End: 2023-12-20

## 2023-12-15 RX ORDER — ACETAMINOPHEN 500 MG
1000 TABLET ORAL ONCE AS NEEDED
Status: DISCONTINUED | OUTPATIENT
Start: 2023-12-15 | End: 2023-12-15 | Stop reason: HOSPADM

## 2023-12-15 RX ORDER — BUPROPION HYDROCHLORIDE 75 MG/1
150 TABLET ORAL 2 TIMES DAILY
Status: DISCONTINUED | OUTPATIENT
Start: 2023-12-15 | End: 2023-12-20

## 2023-12-15 RX ORDER — DIPHENHYDRAMINE HYDROCHLORIDE 50 MG/ML
12.5 INJECTION INTRAMUSCULAR; INTRAVENOUS AS NEEDED
Status: ACTIVE | OUTPATIENT
Start: 2023-12-15 | End: 2023-12-15

## 2023-12-15 RX ORDER — ACETAMINOPHEN 500 MG
1000 TABLET ORAL EVERY 8 HOURS SCHEDULED
Status: DISCONTINUED | OUTPATIENT
Start: 2023-12-15 | End: 2023-12-20

## 2023-12-15 RX ORDER — MIRTAZAPINE 15 MG/1
15 TABLET, ORALLY DISINTEGRATING ORAL NIGHTLY
Status: DISCONTINUED | OUTPATIENT
Start: 2023-12-15 | End: 2023-12-20

## 2023-12-15 RX ORDER — ACETAMINOPHEN 500 MG
1000 TABLET ORAL ONCE
Status: COMPLETED | OUTPATIENT
Start: 2023-12-15 | End: 2023-12-16

## 2023-12-15 RX ORDER — HYDROMORPHONE HYDROCHLORIDE 1 MG/ML
0.6 INJECTION, SOLUTION INTRAMUSCULAR; INTRAVENOUS; SUBCUTANEOUS EVERY 5 MIN PRN
Status: ACTIVE | OUTPATIENT
Start: 2023-12-15 | End: 2023-12-15

## 2023-12-15 RX ORDER — CEFAZOLIN SODIUM 1 G/3ML
INJECTION, POWDER, FOR SOLUTION INTRAMUSCULAR; INTRAVENOUS AS NEEDED
Status: DISCONTINUED | OUTPATIENT
Start: 2023-12-15 | End: 2023-12-15 | Stop reason: SURG

## 2023-12-15 RX ORDER — ROCURONIUM BROMIDE 10 MG/ML
INJECTION, SOLUTION INTRAVENOUS AS NEEDED
Status: DISCONTINUED | OUTPATIENT
Start: 2023-12-15 | End: 2023-12-15 | Stop reason: SURG

## 2023-12-15 RX ORDER — HYDROMORPHONE HYDROCHLORIDE 1 MG/ML
0.4 INJECTION, SOLUTION INTRAMUSCULAR; INTRAVENOUS; SUBCUTANEOUS EVERY 2 HOUR PRN
Status: DISCONTINUED | OUTPATIENT
Start: 2023-12-15 | End: 2023-12-20

## 2023-12-15 RX ORDER — KETAMINE HYDROCHLORIDE 50 MG/ML
INJECTION, SOLUTION, CONCENTRATE INTRAMUSCULAR; INTRAVENOUS AS NEEDED
Status: DISCONTINUED | OUTPATIENT
Start: 2023-12-15 | End: 2023-12-15 | Stop reason: SURG

## 2023-12-15 RX ORDER — SODIUM CHLORIDE, SODIUM LACTATE, POTASSIUM CHLORIDE, CALCIUM CHLORIDE 600; 310; 30; 20 MG/100ML; MG/100ML; MG/100ML; MG/100ML
INJECTION, SOLUTION INTRAVENOUS CONTINUOUS
Status: DISCONTINUED | OUTPATIENT
Start: 2023-12-15 | End: 2023-12-15 | Stop reason: HOSPADM

## 2023-12-15 RX ORDER — METOCLOPRAMIDE HYDROCHLORIDE 5 MG/ML
10 INJECTION INTRAMUSCULAR; INTRAVENOUS EVERY 8 HOURS PRN
Status: DISCONTINUED | OUTPATIENT
Start: 2023-12-15 | End: 2023-12-15 | Stop reason: HOSPADM

## 2023-12-15 RX ORDER — BUPIVACAINE HYDROCHLORIDE AND EPINEPHRINE 5; 5 MG/ML; UG/ML
INJECTION, SOLUTION EPIDURAL; INTRACAUDAL; PERINEURAL AS NEEDED
Status: DISCONTINUED | OUTPATIENT
Start: 2023-12-15 | End: 2023-12-15 | Stop reason: HOSPADM

## 2023-12-15 RX ORDER — HYDROCODONE BITARTRATE AND ACETAMINOPHEN 5; 325 MG/1; MG/1
2 TABLET ORAL ONCE AS NEEDED
Status: DISCONTINUED | OUTPATIENT
Start: 2023-12-15 | End: 2023-12-15 | Stop reason: HOSPADM

## 2023-12-15 RX ORDER — SODIUM CHLORIDE, SODIUM LACTATE, POTASSIUM CHLORIDE, CALCIUM CHLORIDE 600; 310; 30; 20 MG/100ML; MG/100ML; MG/100ML; MG/100ML
INJECTION, SOLUTION INTRAVENOUS CONTINUOUS
Status: DISCONTINUED | OUTPATIENT
Start: 2023-12-15 | End: 2023-12-18

## 2023-12-15 RX ORDER — BACLOFEN 10 MG/1
10 TABLET ORAL NIGHTLY
Status: DISCONTINUED | OUTPATIENT
Start: 2023-12-15 | End: 2023-12-20

## 2023-12-15 RX ORDER — FAMOTIDINE 10 MG/ML
20 INJECTION, SOLUTION INTRAVENOUS 2 TIMES DAILY
Status: DISCONTINUED | OUTPATIENT
Start: 2023-12-15 | End: 2023-12-15

## 2023-12-15 RX ORDER — HYDROMORPHONE HYDROCHLORIDE 1 MG/ML
0.2 INJECTION, SOLUTION INTRAMUSCULAR; INTRAVENOUS; SUBCUTANEOUS EVERY 5 MIN PRN
Status: ACTIVE | OUTPATIENT
Start: 2023-12-15 | End: 2023-12-15

## 2023-12-15 RX ORDER — ONDANSETRON 2 MG/ML
4 INJECTION INTRAMUSCULAR; INTRAVENOUS EVERY 6 HOURS PRN
Status: DISCONTINUED | OUTPATIENT
Start: 2023-12-15 | End: 2023-12-15 | Stop reason: HOSPADM

## 2023-12-15 RX ORDER — SODIUM CHLORIDE, SODIUM LACTATE, POTASSIUM CHLORIDE, CALCIUM CHLORIDE 600; 310; 30; 20 MG/100ML; MG/100ML; MG/100ML; MG/100ML
INJECTION, SOLUTION INTRAVENOUS CONTINUOUS
Status: DISCONTINUED | OUTPATIENT
Start: 2023-12-15 | End: 2023-12-20

## 2023-12-15 RX ADMIN — ROCURONIUM BROMIDE 30 MG: 10 INJECTION, SOLUTION INTRAVENOUS at 11:26:00

## 2023-12-15 RX ADMIN — SODIUM CHLORIDE, SODIUM LACTATE, POTASSIUM CHLORIDE, CALCIUM CHLORIDE: 600; 310; 30; 20 INJECTION, SOLUTION INTRAVENOUS at 11:54:00

## 2023-12-15 RX ADMIN — HYDRALAZINE HYDROCHLORIDE 7 MG: 20 INJECTION INTRAMUSCULAR; INTRAVENOUS at 07:57:00

## 2023-12-15 RX ADMIN — ROCURONIUM BROMIDE 20 MG: 10 INJECTION, SOLUTION INTRAVENOUS at 07:19:00

## 2023-12-15 RX ADMIN — METOCLOPRAMIDE HYDROCHLORIDE 10 MG: 5 INJECTION INTRAMUSCULAR; INTRAVENOUS at 07:04:00

## 2023-12-15 RX ADMIN — MIDAZOLAM HYDROCHLORIDE 2 MG: 1 INJECTION INTRAMUSCULAR; INTRAVENOUS at 07:04:00

## 2023-12-15 RX ADMIN — ACETAMINOPHEN 1000 MG: 10 INJECTION, SOLUTION INTRAVENOUS at 13:21:00

## 2023-12-15 RX ADMIN — KETAMINE HYDROCHLORIDE 25 MG: 50 INJECTION, SOLUTION, CONCENTRATE INTRAMUSCULAR; INTRAVENOUS at 13:03:00

## 2023-12-15 RX ADMIN — CEFAZOLIN SODIUM/WATER 2 G: 2 G/20 ML SYRINGE (ML) INTRAVENOUS at 07:14:00

## 2023-12-15 RX ADMIN — ROCURONIUM BROMIDE 20 MG: 10 INJECTION, SOLUTION INTRAVENOUS at 11:01:00

## 2023-12-15 RX ADMIN — DROPERIDOL 0.62 MG: 2.5 INJECTION, SOLUTION INTRAMUSCULAR; INTRAVENOUS at 07:45:00

## 2023-12-15 RX ADMIN — ROCURONIUM BROMIDE 10 MG: 10 INJECTION, SOLUTION INTRAVENOUS at 07:17:00

## 2023-12-15 RX ADMIN — LIDOCAINE HYDROCHLORIDE 100 MG: 10 INJECTION, SOLUTION EPIDURAL; INFILTRATION; INTRACAUDAL; PERINEURAL at 07:17:00

## 2023-12-15 RX ADMIN — CEFAZOLIN SODIUM 2 G: 1 INJECTION, POWDER, FOR SOLUTION INTRAMUSCULAR; INTRAVENOUS at 11:14:00

## 2023-12-15 RX ADMIN — SODIUM CHLORIDE, SODIUM LACTATE, POTASSIUM CHLORIDE, CALCIUM CHLORIDE: 600; 310; 30; 20 INJECTION, SOLUTION INTRAVENOUS at 10:02:00

## 2023-12-15 RX ADMIN — ROCURONIUM BROMIDE 20 MG: 10 INJECTION, SOLUTION INTRAVENOUS at 08:37:00

## 2023-12-15 RX ADMIN — SODIUM CHLORIDE, SODIUM LACTATE, POTASSIUM CHLORIDE, CALCIUM CHLORIDE: 600; 310; 30; 20 INJECTION, SOLUTION INTRAVENOUS at 13:28:00

## 2023-12-15 RX ADMIN — SODIUM CHLORIDE, SODIUM LACTATE, POTASSIUM CHLORIDE, CALCIUM CHLORIDE: 600; 310; 30; 20 INJECTION, SOLUTION INTRAVENOUS at 07:04:00

## 2023-12-15 RX ADMIN — PHENYLEPHRINE HCL 100 MCG: 10 MG/ML VIAL (ML) INJECTION at 11:27:00

## 2023-12-15 RX ADMIN — KETOROLAC TROMETHAMINE 30 MG: 30 INJECTION, SOLUTION INTRAMUSCULAR; INTRAVENOUS at 13:21:00

## 2023-12-15 RX ADMIN — ONDANSETRON 4 MG: 2 INJECTION INTRAMUSCULAR; INTRAVENOUS at 13:29:00

## 2023-12-15 RX ADMIN — SODIUM CHLORIDE, SODIUM LACTATE, POTASSIUM CHLORIDE, CALCIUM CHLORIDE: 600; 310; 30; 20 INJECTION, SOLUTION INTRAVENOUS at 08:04:00

## 2023-12-15 RX ADMIN — SODIUM CHLORIDE, SODIUM LACTATE, POTASSIUM CHLORIDE, CALCIUM CHLORIDE: 600; 310; 30; 20 INJECTION, SOLUTION INTRAVENOUS at 07:33:00

## 2023-12-15 NOTE — BRIEF OP NOTE
Pre-Operative Diagnosis: Cholecystitis [K81.9]     Post-Operative Diagnosis: Cholecystitis [K81.9], Bile duct injury     Procedure Performed:   Repair bile duct injury, omental pedicle flap    Surgeon(s) and Role:     Mark Cox MD -     Surgical Findings: Small anterior injury at bifurcation     Specimen: Not from my portion     Estimated Blood Loss: minimal during my portion      Deandre Woodard MD  12/15/2023  3:55 PM

## 2023-12-15 NOTE — ANESTHESIA PROCEDURE NOTES
Arterial Line    Date/Time: 12/15/2023 11:21 AM    Performed by: Thomas Deleon MD  Authorized by: Thomas Deleon MD    General Information and Staff    Procedure Start:  12/15/2023 11:21 AM  Procedure End:  12/15/2023 11:21 AM  Anesthesiologist:  Thomas Deleon MD  Performed By:  Anesthesiologist  Patient Location:  OR  Indication: continuous blood pressure monitoring and blood sampling needed    Site Identification: surface landmarks    Preanesthetic Checklist: 2 patient identifiers, IV checked, risks and benefits discussed, monitors and equipment checked, pre-op evaluation, timeout performed, anesthesia consent and sterile technique used    Procedure Details    Catheter Size:  20 G  Catheter Length:  1 and 3/4 inch  Catheter Type:  Arrow  Seldinger Technique?: Yes    Laterality:  Right  Site:  Radial artery  Site Prep: chlorhexidine    Line Secured:  Tape and Tegaderm    Assessment    Events: patient tolerated procedure well with no complications      Medications  12/15/2023 11:21 AM      Additional Comments

## 2023-12-15 NOTE — ANESTHESIA PROCEDURE NOTES
Airway  Date/Time: 12/15/2023 7:17 AM  Urgency: elective    Airway not difficult    General Information and Staff    Patient location during procedure: OR  Anesthesiologist: Mandi Swanson MD  Performed: anesthesiologist   Performed by: Mandi Swanson MD  Authorized by: Mandi Swanson MD      Indications and Patient Condition  Indications for airway management: anesthesia  Spontaneous Ventilation: absent  Sedation level: deep  Preoxygenated: yes  Patient position: sniffing  Mask difficulty assessment: 0 - not attempted    Final Airway Details  Final airway type: endotracheal airway      Successful airway: ETT  Cuffed: yes   Successful intubation technique: Video laryngoscopy  Facilitating devices/methods: intubating stylet  Endotracheal tube insertion site: oral  Blade: GlideScope  Blade size: #3  ETT size (mm): 7.5    Cormack-Lehane Classification: grade I - full view of glottis  Placement verified by: capnometry   Cuff volume (mL): 11  Measured from: lips  ETT to lips (cm): 23  Number of attempts at approach: 1  Number of other approaches attempted: 0

## 2023-12-15 NOTE — OPERATIVE REPORT
[facility]  Operative Note    Inocente Munoz Location: OR   CSN [id number] MRN [id number]    1960 Age 70year old   Admission Date 12/15/2023 Operation Date 12/15/2023   Attending Physician Amy León MD Operating Physician Sunday Horner MD   PCP Nicolezion Meryl          Patient Name: Inocente Munoz    Preoperative Diagnosis: Cholecystitis [K81.9]    Postoperative Diagnosis: Chronic cholecystitis    Primary Surgeon: Sunday Horner MD    Assistant: Juani WEEKS, Richmond Priest MD    Anesthesia: General    Procedures: Laparoscopic converted to open cholecystectomy with intraoperative cholangiogram    Implants: None    Specimen: Gallbladder    Drains: 23 [place] Ramon drain    Estimated Blood Loss: 061 cc    Complications: Common bile duct injury    Condition: Stable    Indications for Surgery: Inocente Munoz is a very nice 59-year-old gentleman with history of recent CVA in 2023 with residual dysphagia, currently residing in nursing home and G-tube dependent who I met in the ER on 2023 when he presented there with right upper quadrant pain and vomiting. His work-up was suggestive of cholecystitis. He had a percutaneous cholecystostomy tube placed by interventional radiology on 2023. He did well after this procedure and was discharged back to his skilled nursing facility on 2023. He returned for follow-up today with his wife. Patient has been doing better overall. He remains G-tube feed dependent. He remains nonambulatory. He is however slowly gaining his strength back. He denies any abdominal pain, fevers, nausea, vomiting or jaundice. Only prior abdominal surgery is PEG tube placement. No blood thinners. I discussed options of clamp trial with possible tube removal versus planning for laparoscopic cholecystectomy with intraoperative cholangiogram, possible open.   I would favor cholecystectomy given the fact that the patient has high risk of recurrent cholecystitis or other gallstone complications in the future. The details of surgery were discussed including the expected recovery time, risks, benefits and alternatives. Patient and wife expressed understanding and wished to schedule surgery with me. He presents for elective surgery today. He obtain preoperative medical clearance. Consent was signed. All questions answered. Surgical Findings:   Severe chronic cholecystitis with thick fibrotic rind overlying the gallbladder. The neck of the gallbladder was densely adherent to the liver bed and common bile duct. There was a small injury to the common bile duct during dissection of the gallbladder off the liver bed. The common bile duct injury was repaired primarily by Dr. Alondra Campo with negative leak test and normal cholangiogram after repair. Description of Procedure:   Patient was brought to the operating room and positioned supine on a pink foam pad. Bilateral sequential compression device were placed. Preoperative antibiotics were given. Patient was induced under general endotracheal anesthesia. The abdomen was prepped and draped in the usual sterile fashion. A timeout was performed. A 12 mm transverse skin incision was made just above the umbilicus. Pneumoperitoneum was established using a Veress needle at this site. There was appropriately low opening pressure. The Veress needle was removed and a 12 mm Optiview trocar was inserted into the peritoneal cavity under direct vision. There was no evidence of underlying visceral injury. A 5 mm epigastric trocar was placed under direct vision. Patient was positioned in reverse Trendelenburg and tilted with right side up. 2 additional 5 mm trocars were placed under direct vision beneath the right costal margin. The omentum was adherent to the fundus of the gallbladder. The omental adhesions were divided using hook electrocautery.   I then identified the fundus of the gallbladder beneath the right lobe of the liver. The gallbladder fundus was retracted cephalad over the liver. I began by scoring the peritoneum overlying the neck and infundibulum of the gallbladder using hook electrocautery. The peritoneum and fatty-fibrotic tissue overlying the neck and infundibulum of the gallbladder was dissected off using a combination of sharp and blunt dissection with hook electrocautery, a [place] dissector, suction  device and laparoscopic peanuts. This dissection was very difficult in tedious due to the chronic inflammation creating dense scarring between the gallbladder and liver bed. The liver bed did ooze throughout the dissection and any bleeding spots were controlled with electrocautery. While trying to dissect the neck of the gallbladder off the liver bed, I did notice leakage of bilious fluid. I continued to dissect the body and fundus of the gallbladder off the liver bed in a top-down approach. The percutaneous cholecystostomy tube was removed. I was able to cannulate the structure that was leaking bilious fluid with a cholangiocatheter. A cholangiogram was performed and there appeared to be some filling of the intrahepatic ducts. There was no filling of the common bile duct or duodenum. This raise my concern for the possibility of common bile duct injury. Dr. Lourdes Mariee was called into the operating room for intraoperative consultation. He confirmed my suspicion that there appeared to be a common bile duct injury. The cystic artery was isolated, ligated between metal clips and divided, two on the stay side and one on the specimen side. At this point, the gallbladder was completely free from the liver bed except in the area of the infundibulum. We decided to convert to an open approach to complete the dissection around the infundibulum and assess the suspected common bile duct injury. The right upper quadrant subcostal skin incision was made.   This was deepened down through the subcutaneous fat, anterior fascia, muscle, posterior fascia and peritoneum using electrocautery. Pneumoperitoneum was evacuated and all laparoscopic trocars were removed. A [place] retractor was placed. Dr. Archana Blue then scrubbed into the procedure to repair the common bile duct injury, performed a leak test and intraoperative cholangiogram, complete the cholecystectomy and place an omental flap over the area. Please see his operative report for details of this portion of the procedure. To complete the cholecystectomy, any fibrotic adhesions around the infundibulum were dissected out and divided using electrocautery until the cystic duct was isolated. After completion of the cholangiogram through the cystic duct, the stay portion was ligated using 2 large metal clips and divided. The gallbladder was passed off the field to be sent to pathology. The [place] retractor was removed from the field. The surgical field was copiously irrigated with warm saline solution. Any remaining small bleeding vessels within the liver bed were controlled with electrocautery. The surgical field was again copiously irrigated and suctioned. The surgical field and liver bed appeared hemostatic. A 19 [place] Ramon drain was introduced through the right lateral trocar site, position beneath the liver bed and secured in place using a 3-0 nylon suture. The patient was leveled. The fascia at the umbilical site was closed using an interrupted 0 Vicryl suture. The posterior fascia and peritoneum was closed using a running #1 Vicryl suture. The anterior fascia was closed using a running #1 PDS suture x 2. The subcutaneous tissue was irrigated with warm saline solution and appeared hemostatic. All skin incisions were closed using a skin stapler. The right subcostal incision and umbilical incisions were anesthetized using 30 cc of 0.5% Marcaine with epinephrine. The skin was cleaned and dried.   A drain sponge and tape was placed around the drain site. The laparoscopic incisions were dressed with gauze and Tegaderm. The subcostal incision was dressed with an Aquacel dressing. The patient was awakened from anesthesia, extubated and transported to the postanesthesia care unit in stable condition. All sponge, needle and instrument counts were correct at the end of the case. I was present for the entire case.     Jhony Xavier MD  12/15/2023  1:40 PM

## 2023-12-16 ENCOUNTER — APPOINTMENT (OUTPATIENT)
Dept: GENERAL RADIOLOGY | Facility: HOSPITAL | Age: 63
End: 2023-12-16
Payer: COMMERCIAL

## 2023-12-16 ENCOUNTER — APPOINTMENT (OUTPATIENT)
Dept: GENERAL RADIOLOGY | Facility: HOSPITAL | Age: 63
DRG: 409 | End: 2023-12-16
Payer: COMMERCIAL

## 2023-12-16 PROBLEM — S36.13XA BILE DUCT INJURY: Status: ACTIVE | Noted: 2023-12-16

## 2023-12-16 LAB
ALBUMIN SERPL-MCNC: 2.4 G/DL (ref 3.4–5)
ALBUMIN/GLOB SERPL: 0.6 {RATIO} (ref 1–2)
ALP LIVER SERPL-CCNC: 134 U/L
ALT SERPL-CCNC: 202 U/L
ANION GAP SERPL CALC-SCNC: 8 MMOL/L (ref 0–18)
AST SERPL-CCNC: 228 U/L (ref 15–37)
BASOPHILS # BLD AUTO: 0.02 X10(3) UL (ref 0–0.2)
BASOPHILS NFR BLD AUTO: 0.1 %
BILIRUB SERPL-MCNC: 0.6 MG/DL (ref 0.1–2)
BUN BLD-MCNC: 23 MG/DL (ref 9–23)
CALCIUM BLD-MCNC: 8.8 MG/DL (ref 8.5–10.1)
CHLORIDE SERPL-SCNC: 107 MMOL/L (ref 98–112)
CO2 SERPL-SCNC: 23 MMOL/L (ref 21–32)
CREAT BLD-MCNC: 0.86 MG/DL
EGFRCR SERPLBLD CKD-EPI 2021: 96 ML/MIN/1.73M2 (ref 60–?)
EOSINOPHIL # BLD AUTO: 0.01 X10(3) UL (ref 0–0.7)
EOSINOPHIL NFR BLD AUTO: 0.1 %
ERYTHROCYTE [DISTWIDTH] IN BLOOD BY AUTOMATED COUNT: 13.9 %
GLOBULIN PLAS-MCNC: 3.8 G/DL (ref 2.8–4.4)
GLUCOSE BLD-MCNC: 123 MG/DL (ref 70–99)
GLUCOSE BLD-MCNC: 125 MG/DL (ref 70–99)
GLUCOSE BLD-MCNC: 138 MG/DL (ref 70–99)
GLUCOSE BLD-MCNC: 140 MG/DL (ref 70–99)
GLUCOSE BLD-MCNC: 143 MG/DL (ref 70–99)
HCT VFR BLD AUTO: 40.4 %
HGB BLD-MCNC: 13.3 G/DL
IMM GRANULOCYTES # BLD AUTO: 0.06 X10(3) UL (ref 0–1)
IMM GRANULOCYTES NFR BLD: 0.4 %
LYMPHOCYTES # BLD AUTO: 1.27 X10(3) UL (ref 1–4)
LYMPHOCYTES NFR BLD AUTO: 8.5 %
MCH RBC QN AUTO: 31.5 PG (ref 26–34)
MCHC RBC AUTO-ENTMCNC: 32.9 G/DL (ref 31–37)
MCV RBC AUTO: 95.7 FL
MONOCYTES # BLD AUTO: 0.9 X10(3) UL (ref 0.1–1)
MONOCYTES NFR BLD AUTO: 6 %
NEUTROPHILS # BLD AUTO: 12.76 X10 (3) UL (ref 1.5–7.7)
NEUTROPHILS # BLD AUTO: 12.76 X10(3) UL (ref 1.5–7.7)
NEUTROPHILS NFR BLD AUTO: 84.9 %
OSMOLALITY SERPL CALC.SUM OF ELEC: 292 MOSM/KG (ref 275–295)
PLATELET # BLD AUTO: 210 10(3)UL (ref 150–450)
POTASSIUM SERPL-SCNC: 4.8 MMOL/L (ref 3.5–5.1)
PROT SERPL-MCNC: 6.2 G/DL (ref 6.4–8.2)
RBC # BLD AUTO: 4.22 X10(6)UL
SODIUM SERPL-SCNC: 138 MMOL/L (ref 136–145)
WBC # BLD AUTO: 15 X10(3) UL (ref 4–11)

## 2023-12-16 PROCEDURE — 99233 SBSQ HOSP IP/OBS HIGH 50: CPT | Performed by: HOSPITALIST

## 2023-12-16 PROCEDURE — 73060 X-RAY EXAM OF HUMERUS: CPT

## 2023-12-16 NOTE — PLAN OF CARE
Received pt asleep A&Ox3. VSS. RA. . GI: Abdomen soft, rounded nondistended. : gatica cath in place  Pain controlled with Tylenol  Drains: Rt ESTHELA drain  Incisions: Lap x2 skinglue, Lap x2 gauze and tegaderm  Diet: NPO with ice chips  IVF running per order. IV LR @100  All appropriate safety measures in place. All questions and concerns addressed.

## 2023-12-16 NOTE — PROGRESS NOTES
Patient has IV fluids infusing, on room air, tube feedings started at a trickle rate, gatica removed and patient is due to void, incisions are C/D/I, ESTHELA drain x1 with serosanguinous drainage, Dilaudid given for pain-will transition to Oxycodone, alert x2-3, NPO with ice and sips of clear liquids but patient is refusing to take in anything orally. Patient and family updated on plan of care.

## 2023-12-16 NOTE — PROGRESS NOTES
NURSING ADMISSION NOTE      Patient admitted via Cart  Oriented to room. Safety precautions initiated. Bed in low position. Call light in reach. Drowsy/sleepy. Awakens to verbal stimuli. VSS Afebrile  Denies pain at this time. Denies nausea or emesis. Note abdomen surgical site right upper abdomen incision with aquacel dressing in place with small amount of drainage under dressing. Lap sites x 2 with gauze and tegaderm  clean, dry and intact. Right ESTHELA drain intact draining red serosanguinous drainage.

## 2023-12-16 NOTE — PHYSICAL THERAPY NOTE
PT orders received,chart reviewed. Discussed with RN, per EMR and confirmed by pt: pt is LTC at [facility] and is dependent at baseline requiring bib lift for transfers. Pt has no skilled PT needs at this time, PT to sign off.

## 2023-12-17 PROBLEM — Z01.818 PRE-OP TESTING: Status: ACTIVE | Noted: 2023-12-17

## 2023-12-17 LAB
ALBUMIN SERPL-MCNC: 2 G/DL (ref 3.4–5)
ALBUMIN/GLOB SERPL: 0.7 {RATIO} (ref 1–2)
ALP LIVER SERPL-CCNC: 114 U/L
ALT SERPL-CCNC: 102 U/L
ANION GAP SERPL CALC-SCNC: 5 MMOL/L (ref 0–18)
AST SERPL-CCNC: 89 U/L (ref 15–37)
BILIRUB SERPL-MCNC: 0.6 MG/DL (ref 0.1–2)
BUN BLD-MCNC: 14 MG/DL (ref 9–23)
CALCIUM BLD-MCNC: 8.4 MG/DL (ref 8.5–10.1)
CHLORIDE SERPL-SCNC: 108 MMOL/L (ref 98–112)
CO2 SERPL-SCNC: 27 MMOL/L (ref 21–32)
CREAT BLD-MCNC: 0.63 MG/DL
EGFRCR SERPLBLD CKD-EPI 2021: 106 ML/MIN/1.73M2 (ref 60–?)
ERYTHROCYTE [DISTWIDTH] IN BLOOD BY AUTOMATED COUNT: 13.8 %
GLOBULIN PLAS-MCNC: 3 G/DL (ref 2.8–4.4)
GLUCOSE BLD-MCNC: 105 MG/DL (ref 70–99)
GLUCOSE BLD-MCNC: 131 MG/DL (ref 70–99)
GLUCOSE BLD-MCNC: 133 MG/DL (ref 70–99)
GLUCOSE BLD-MCNC: 136 MG/DL (ref 70–99)
HCT VFR BLD AUTO: 30.4 %
HGB BLD-MCNC: 10 G/DL
MCH RBC QN AUTO: 31.5 PG (ref 26–34)
MCHC RBC AUTO-ENTMCNC: 32.9 G/DL (ref 31–37)
MCV RBC AUTO: 95.9 FL
OSMOLALITY SERPL CALC.SUM OF ELEC: 292 MOSM/KG (ref 275–295)
PLATELET # BLD AUTO: 160 10(3)UL (ref 150–450)
POTASSIUM SERPL-SCNC: 3.8 MMOL/L (ref 3.5–5.1)
PROT SERPL-MCNC: 5 G/DL (ref 6.4–8.2)
RBC # BLD AUTO: 3.17 X10(6)UL
SODIUM SERPL-SCNC: 140 MMOL/L (ref 136–145)
WBC # BLD AUTO: 12.1 X10(3) UL (ref 4–11)

## 2023-12-17 PROCEDURE — 99232 SBSQ HOSP IP/OBS MODERATE 35: CPT | Performed by: HOSPITALIST

## 2023-12-17 NOTE — PROGRESS NOTES
1315 - Pt tolerating advancement, unwilling to further advance TF rate at this time. 1135 - Advanced tube feeds per sx and sx onc. Upon assessment, A&Ox3. L side flaccid. WC bound. RA. IS encouraged. SCDs. Lovenox for VTE proph. NPO, tolerating small amounts of ice chips. Active bowel sounds. Abdomen soft and tender. Laps x2 & transverse, C/D/I, old drainage noted to aquacel. Jpx1 w/ ss output. Incontinent of bowel and bladder. PEG tube feedings being administered. Pain managed per [facility], educated pt on non-pharmacological interventions. Denies SOB, CP, lightheadedness, and N/V. POC discussed, all questions answered and concerns addressed. Safety precautions in place. Frequent rounds performed.

## 2023-12-17 NOTE — PLAN OF CARE
Pt takes Oxycodone prn and Dilaudid IV for breakthrough pain. A/Ox4, BP stable, afebrile. NPO except ice and sips of clears, tolerating GT feeding at a trickle rate, HOB kept elevated. Lap sites and Aquacel dsg to surg site cdi. Abdomen soft, non-distended, bs present, no belching or passing flatus yet per pt. Updated with plan of care, verbalized understanding.

## 2023-12-18 ENCOUNTER — APPOINTMENT (OUTPATIENT)
Dept: GENERAL RADIOLOGY | Facility: HOSPITAL | Age: 63
DRG: 409 | End: 2023-12-18
Attending: STUDENT IN AN ORGANIZED HEALTH CARE EDUCATION/TRAINING PROGRAM
Payer: COMMERCIAL

## 2023-12-18 ENCOUNTER — APPOINTMENT (OUTPATIENT)
Dept: GENERAL RADIOLOGY | Facility: HOSPITAL | Age: 63
End: 2023-12-18
Attending: STUDENT IN AN ORGANIZED HEALTH CARE EDUCATION/TRAINING PROGRAM
Payer: COMMERCIAL

## 2023-12-18 LAB
ALBUMIN SERPL-MCNC: 1.8 G/DL (ref 3.4–5)
ALBUMIN/GLOB SERPL: 0.6 {RATIO} (ref 1–2)
ALP LIVER SERPL-CCNC: 136 U/L
ALT SERPL-CCNC: 70 U/L
ANION GAP SERPL CALC-SCNC: 5 MMOL/L (ref 0–18)
AST SERPL-CCNC: 48 U/L (ref 15–37)
BASOPHILS # BLD AUTO: 0.04 X10(3) UL (ref 0–0.2)
BASOPHILS NFR BLD AUTO: 0.3 %
BILIRUB SERPL-MCNC: 0.6 MG/DL (ref 0.1–2)
BUN BLD-MCNC: 10 MG/DL (ref 9–23)
CALCIUM BLD-MCNC: 8.3 MG/DL (ref 8.5–10.1)
CHLORIDE SERPL-SCNC: 110 MMOL/L (ref 98–112)
CO2 SERPL-SCNC: 26 MMOL/L (ref 21–32)
CREAT BLD-MCNC: 0.52 MG/DL
EGFRCR SERPLBLD CKD-EPI 2021: 112 ML/MIN/1.73M2 (ref 60–?)
EOSINOPHIL # BLD AUTO: 0.24 X10(3) UL (ref 0–0.7)
EOSINOPHIL NFR BLD AUTO: 1.9 %
ERYTHROCYTE [DISTWIDTH] IN BLOOD BY AUTOMATED COUNT: 13.6 %
GLOBULIN PLAS-MCNC: 3.2 G/DL (ref 2.8–4.4)
GLUCOSE BLD-MCNC: 114 MG/DL (ref 70–99)
GLUCOSE BLD-MCNC: 119 MG/DL (ref 70–99)
GLUCOSE BLD-MCNC: 131 MG/DL (ref 70–99)
GLUCOSE BLD-MCNC: 140 MG/DL (ref 70–99)
GLUCOSE BLD-MCNC: 146 MG/DL (ref 70–99)
HCT VFR BLD AUTO: 34.4 %
HGB BLD-MCNC: 11.3 G/DL
IMM GRANULOCYTES # BLD AUTO: 0.06 X10(3) UL (ref 0–1)
IMM GRANULOCYTES NFR BLD: 0.5 %
LYMPHOCYTES # BLD AUTO: 0.84 X10(3) UL (ref 1–4)
LYMPHOCYTES NFR BLD AUTO: 6.5 %
MCH RBC QN AUTO: 31.5 PG (ref 26–34)
MCHC RBC AUTO-ENTMCNC: 32.8 G/DL (ref 31–37)
MCV RBC AUTO: 95.8 FL
MONOCYTES # BLD AUTO: 0.8 X10(3) UL (ref 0.1–1)
MONOCYTES NFR BLD AUTO: 6.2 %
NEUTROPHILS # BLD AUTO: 10.85 X10 (3) UL (ref 1.5–7.7)
NEUTROPHILS # BLD AUTO: 10.85 X10(3) UL (ref 1.5–7.7)
NEUTROPHILS NFR BLD AUTO: 84.6 %
OSMOLALITY SERPL CALC.SUM OF ELEC: 293 MOSM/KG (ref 275–295)
PLATELET # BLD AUTO: 176 10(3)UL (ref 150–450)
POTASSIUM SERPL-SCNC: 3.7 MMOL/L (ref 3.5–5.1)
PROT SERPL-MCNC: 5 G/DL (ref 6.4–8.2)
RBC # BLD AUTO: 3.59 X10(6)UL
SODIUM SERPL-SCNC: 141 MMOL/L (ref 136–145)
WBC # BLD AUTO: 12.8 X10(3) UL (ref 4–11)

## 2023-12-18 PROCEDURE — 71045 X-RAY EXAM CHEST 1 VIEW: CPT | Performed by: STUDENT IN AN ORGANIZED HEALTH CARE EDUCATION/TRAINING PROGRAM

## 2023-12-18 PROCEDURE — 99233 SBSQ HOSP IP/OBS HIGH 50: CPT | Performed by: HOSPITALIST

## 2023-12-18 NOTE — OPERATIVE REPORT
[address]    PATIENT'S NAME: Do Solomon   ATTENDING PHYSICIAN: Libby Sue MD   OPERATING PHYSICIAN: Albert Kim. Colt Sparks MD   PATIENT ACCOUNT#:   [id number]    LOCATION:  [address] [place] [address]  MEDICAL RECORD #:   [id number]       YOB: 1960  ADMISSION DATE:       12/15/2023      OPERATION DATE:  12/15/2023    OPERATIVE REPORT    PREOPERATIVE DIAGNOSIS:  Cholecystitis. POSTOPERATIVE DIAGNOSIS:    1. Cholecystitis. 2.   Bile duct injury. PROCEDURE:    Repair of bile duct injury  Omental pedicle flap. ASSISTANT SURGEON:  Libby Sue MD    ANESTHESIA:  General.    OPERATIVE TECHNIQUE:  This is an intraoperative consultation by Dr. Kourtney Solis at the time of laparoscopic cholecystectomy, whereby bile duct injury was noted. Upon my presentation to the operating theater, patient was still undergoing laparoscopic surgery. The gallbladder was dissected off the fossa unto the liver bed with apparent attachments of ductal tissue to the common hepatic duct. Noted was an anterior tear in the proximal common hepatic duct to the left that was less than 50% of circumference. For repair, we proceeded with open laparotomy performed by Dr. Kourtney Solis. A [place] retractor was used to help in exposure. A right subcostal incision had been made by Dr. Kourtney Solis. The gallbladder was further dissected at the infundibulum, clearly identifying the cystic duct. The injury as noted above was less than 50% circumference, located anteriorly to the left at the very proximal common hepatic duct. I introduced a probe into the tear, and it coursed easily into the right and left hepatic ducts. The repair was feasible primarily without lumen-narrowing in a transverse fashion using interrupted 5-0 PDS sutures. A cholangiocatheter was introduced by Dr. Kourtney Solis into the cystic duct after a ductotomy was made.   Occlusion of the cystic duct and introduction of air into a syringe revealed no evidence for leakage from this site. This was also confirmed by intraoperative cholangiogram.  The omentum was then dissected off the right side  from the transverse colon and the stomach. Division of the process was undertaken using harmonic scalpel creating a right-sided pedicle. It was then delivered to cover the region of the kylah hepatis and sutured into place to the adjacent liver. Following successful completion of bile duct injury repair, Dr. Marito Lei proceeded with surgery. Patient tolerated my portion of the procedure well without immediate complications. Dictated By Lion Almaguer MD  d: 12/16/2023 10:10:10  t: 12/16/2023 10:17:17  [facility] [id number]  UAX/

## 2023-12-18 NOTE — PROGRESS NOTES
Upon assessment, A&Ox3. L side flaccid. WC bound. RA. IS encouraged. SCDs. Lovenox for VTE proph. NPO, tolerating small amounts of ice chips. Active bowel sounds. Abdomen soft and tender. Laps x2 & transverse, C/D/I, ОЛЬГА. Jpx1 w/ ss output. Incontinent of bowel and bladder. PEG tube feeding rate advanced. Pain managed per [facility], educated pt on non-pharmacological interventions. Denies SOB, CP, lightheadedness, and N/V. POC discussed, all questions answered and concerns addressed. Safety precautions in place. Frequent rounds performed.

## 2023-12-18 NOTE — PLAN OF CARE
Pt alert x 3. VSS on RA. Medicated for pain. WC bound. NPO, tolerating tube feeding. Abdominal incisions with old drainage to dressing. ESTHELA drain in place with ss output. External catheter in place. Acll current needs met. Call light in reach.

## 2023-12-18 NOTE — OCCUPATIONAL THERAPY NOTE
OT orders received,chart reviewed. Per EMR and per PT conversation with pt: pt is LTC at [facility] and is dependent at baseline for ADLs and requiring bib lift for transfers. Pt has no skilled OT needs at this time, OT to sign off.

## 2023-12-19 LAB
ALBUMIN SERPL-MCNC: 1.9 G/DL (ref 3.4–5)
ALBUMIN/GLOB SERPL: 0.5 {RATIO} (ref 1–2)
ALP LIVER SERPL-CCNC: 205 U/L
ALT SERPL-CCNC: 85 U/L
ANION GAP SERPL CALC-SCNC: 6 MMOL/L (ref 0–18)
AST SERPL-CCNC: 78 U/L (ref 15–37)
BILIRUB SERPL-MCNC: 0.4 MG/DL (ref 0.1–2)
BUN BLD-MCNC: 10 MG/DL (ref 9–23)
CALCIUM BLD-MCNC: 8.7 MG/DL (ref 8.5–10.1)
CHLORIDE SERPL-SCNC: 109 MMOL/L (ref 98–112)
CO2 SERPL-SCNC: 25 MMOL/L (ref 21–32)
CREAT BLD-MCNC: 0.82 MG/DL
EGFRCR SERPLBLD CKD-EPI 2021: 97 ML/MIN/1.73M2 (ref 60–?)
ERYTHROCYTE [DISTWIDTH] IN BLOOD BY AUTOMATED COUNT: 13.7 %
GLOBULIN PLAS-MCNC: 4 G/DL (ref 2.8–4.4)
GLUCOSE BLD-MCNC: 101 MG/DL (ref 70–99)
GLUCOSE BLD-MCNC: 105 MG/DL (ref 70–99)
GLUCOSE BLD-MCNC: 121 MG/DL (ref 70–99)
GLUCOSE BLD-MCNC: 155 MG/DL (ref 70–99)
GLUCOSE BLD-MCNC: 162 MG/DL (ref 70–99)
HCT VFR BLD AUTO: 39.9 %
HGB BLD-MCNC: 12.6 G/DL
MCH RBC QN AUTO: 30.4 PG (ref 26–34)
MCHC RBC AUTO-ENTMCNC: 31.6 G/DL (ref 31–37)
MCV RBC AUTO: 96.1 FL
OSMOLALITY SERPL CALC.SUM OF ELEC: 289 MOSM/KG (ref 275–295)
PLATELET # BLD AUTO: 180 10(3)UL (ref 150–450)
POTASSIUM SERPL-SCNC: 3.9 MMOL/L (ref 3.5–5.1)
PROT SERPL-MCNC: 5.9 G/DL (ref 6.4–8.2)
RBC # BLD AUTO: 4.15 X10(6)UL
SODIUM SERPL-SCNC: 140 MMOL/L (ref 136–145)
WBC # BLD AUTO: 9.3 X10(3) UL (ref 4–11)

## 2023-12-19 PROCEDURE — 99232 SBSQ HOSP IP/OBS MODERATE 35: CPT | Performed by: STUDENT IN AN ORGANIZED HEALTH CARE EDUCATION/TRAINING PROGRAM

## 2023-12-19 RX ORDER — HYDROCODONE BITARTRATE AND ACETAMINOPHEN 5; 325 MG/1; MG/1
1-2 TABLET ORAL EVERY 4 HOURS PRN
Qty: 21 TABLET | Refills: 0 | Status: SHIPPED | OUTPATIENT
Start: 2023-12-19 | End: 2023-12-19

## 2023-12-19 RX ORDER — POLYETHYLENE GLYCOL 3350 17 G/17G
17 POWDER, FOR SOLUTION ORAL DAILY
Status: DISCONTINUED | OUTPATIENT
Start: 2023-12-19 | End: 2023-12-20

## 2023-12-19 NOTE — PLAN OF CARE
A&Ox4. VSS. RA. . IS. Denies chest pain and SOB. GI: Abdomen soft, nondistended. Passing gas. Belching present. Denies nausea. : Voids. External male catheter in place   Pain controlled with PRN pain medications. Utilizes wheelchair   Drains: ESTHELA drain, G tube - feeding  Incisions: X2 abd lap sites with staples, R transverse incision with staples - CDI   Diet: G tube feedings, ice-chips   IVF running per order. All appropriate safety measures in place. All questions and concerns addressed.

## 2023-12-19 NOTE — PLAN OF CARE
Pt alert x 4. VS on RA. Medicated for pain. No complaints of nausea. NPO with ice chips. Abdominal incisions clean and dry. ESTHELA drain with SS output. Tube feed infusing at 40ml/hr/ IVF infusing. POC discussed. All current needs met. Call light in reach.

## 2023-12-20 ENCOUNTER — TELEPHONE (OUTPATIENT)
Dept: CASE MANAGEMENT | Facility: HOSPITAL | Age: 63
End: 2023-12-20

## 2023-12-20 VITALS
HEIGHT: 69 IN | HEART RATE: 75 BPM | RESPIRATION RATE: 18 BRPM | WEIGHT: 145 LBS | TEMPERATURE: 98 F | DIASTOLIC BLOOD PRESSURE: 81 MMHG | OXYGEN SATURATION: 96 % | BODY MASS INDEX: 21.48 KG/M2 | SYSTOLIC BLOOD PRESSURE: 131 MMHG

## 2023-12-20 LAB
BASOPHILS # BLD AUTO: 0.03 X10(3) UL (ref 0–0.2)
BASOPHILS NFR BLD AUTO: 0.4 %
EOSINOPHIL # BLD AUTO: 0.3 X10(3) UL (ref 0–0.7)
EOSINOPHIL NFR BLD AUTO: 3.6 %
ERYTHROCYTE [DISTWIDTH] IN BLOOD BY AUTOMATED COUNT: 13.6 %
GLUCOSE BLD-MCNC: 164 MG/DL (ref 70–99)
GLUCOSE BLD-MCNC: 179 MG/DL (ref 70–99)
HCT VFR BLD AUTO: 33.7 %
HGB BLD-MCNC: 10.8 G/DL
IMM GRANULOCYTES # BLD AUTO: 0.04 X10(3) UL (ref 0–1)
IMM GRANULOCYTES NFR BLD: 0.5 %
LYMPHOCYTES # BLD AUTO: 0.84 X10(3) UL (ref 1–4)
LYMPHOCYTES NFR BLD AUTO: 10.2 %
MCH RBC QN AUTO: 30.9 PG (ref 26–34)
MCHC RBC AUTO-ENTMCNC: 32 G/DL (ref 31–37)
MCV RBC AUTO: 96.3 FL
MONOCYTES # BLD AUTO: 0.58 X10(3) UL (ref 0.1–1)
MONOCYTES NFR BLD AUTO: 7 %
NEUTROPHILS # BLD AUTO: 6.44 X10 (3) UL (ref 1.5–7.7)
NEUTROPHILS # BLD AUTO: 6.44 X10(3) UL (ref 1.5–7.7)
NEUTROPHILS NFR BLD AUTO: 78.3 %
PLATELET # BLD AUTO: 218 10(3)UL (ref 150–450)
RBC # BLD AUTO: 3.5 X10(6)UL
WBC # BLD AUTO: 8.2 X10(3) UL (ref 4–11)

## 2023-12-20 PROCEDURE — 99232 SBSQ HOSP IP/OBS MODERATE 35: CPT | Performed by: STUDENT IN AN ORGANIZED HEALTH CARE EDUCATION/TRAINING PROGRAM

## 2023-12-20 RX ORDER — BISACODYL 10 MG
10 SUPPOSITORY, RECTAL RECTAL ONCE
Status: DISCONTINUED | OUTPATIENT
Start: 2023-12-20 | End: 2023-12-20

## 2023-12-20 RX ORDER — OXYCODONE HYDROCHLORIDE 5 MG/1
5 TABLET ORAL EVERY 4 HOURS PRN
Qty: 15 TABLET | Refills: 0 | Status: SHIPPED | OUTPATIENT
Start: 2023-12-20 | End: 2023-12-20

## 2023-12-20 RX ORDER — HYDROCODONE BITARTRATE AND ACETAMINOPHEN 5; 325 MG/1; MG/1
1-2 TABLET ORAL EVERY 4 HOURS PRN
Qty: 15 TABLET | Refills: 0 | Status: SHIPPED | OUTPATIENT
Start: 2023-12-20

## 2023-12-20 NOTE — CM/SW NOTE
12/19/23 1000   CM/SW Referral Data   Reason for Referral Discharge planning   Patient [id number] [facility] Acute Care Provider Upon Admission Johnny Turner     Per chart review, pt is a LTC resident of [facility]. Updates sent to facility via [employer]. Message sent to facility to confirm he is a LTC resident, as he does not have Medicaid listed as a secondary insurance plan in EMR. Facility did confirm they can accept him back when he is ready. CM/SW to remain available to assist with discharge planning. [facility].      Lovely Carter, ISHAN, RN-BC    [id number]
Pt is ready for dc today. Pt will return to [address] [place] [address]. Coordinated dc time with Lily from [facility]. RN to call report to [phone]. Wife asked that [facility] be used to transport pt back to [facility]. [facility] is scheduled to  pt at 1:00pm.  PCS form completed. RN will notify wife of pt's dc time.
Xray Chest 1 View AP/PA.

## 2023-12-20 NOTE — CM/SW NOTE
Steven Archibald RN case manager with [facility] calling to offer discharge planning assistance. If needed please call back at [phone].

## 2023-12-20 NOTE — PROGRESS NOTES
NURSING DISCHARGE NOTE    Discharged Rehab facility via Ambulance. Accompanied by Support staff  Belongings Taken by patient/family. Report given to Tete Bal, RN at [facility], RN aware of ROS, patient's summary of stay, recent vitals, prescirptions, and follow up appointment. Patient's briefs were changed and patient changed to his own clothes. ESTHELA drain removed. Patient tolerated removal well. Gauze placed at new ESTHELA drain site. Patient left via ambulance with belongings in stable condition.

## 2023-12-20 NOTE — PLAN OF CARE
Pt alert x 4. VSS on RA. No complaints of pain. External catheter in place. ESTHELA drain draining serosanguineous fluid. NPO with ice hips. Tube feed infusing through Gtube. Abdominal incisions with staples intact. All current needs met.  Call light in reach

## 2023-12-20 NOTE — PLAN OF CARE
Patient is alert and oriented. On room air. Abdomen is soft/ tender. Lap sites x2 with right transverse incision closed with staples-cd/I. Voiding via external male catheter. Receiving TF at 40 ml/ hr. ESTHELA drain x1 with serosanguinous output. NPO with ice chips. Patient refusing suppository. Plans for discharge today. Call light within reach. [Negative] : Heme/Lymph

## 2023-12-22 NOTE — PAYOR COMM NOTE
Discharge Notification    Patient Name: Laurinda Goldmann: Porfirio Watson #: [id number]  Authorization Number: [id number]  Admit Date/Time: 12/15/2023 5:20 AM  Discharge Date/Time: 12/20/2023 2:17 PM

## 2023-12-27 ENCOUNTER — TELEPHONE (OUTPATIENT)
Facility: LOCATION | Age: 63
End: 2023-12-27

## 2023-12-27 NOTE — TELEPHONE ENCOUNTER
Patient's wife is calling to see if we can remove the patient's staples with him staying in the wheelchair, he won't be able to get on the table.      Please advise  Best callback number is wife Leonel Valente [phone]

## 2023-12-27 NOTE — TELEPHONE ENCOUNTER
Spoke with spouse. Advised spouse it should not be an issue to remove staples with him staying in his wheelchair.

## 2023-12-29 ENCOUNTER — OFFICE VISIT (OUTPATIENT)
Facility: LOCATION | Age: 63
End: 2023-12-29
Payer: MEDICARE

## 2023-12-29 VITALS — TEMPERATURE: 98 F

## 2023-12-29 DIAGNOSIS — Z98.890 POST-OPERATIVE STATE: Primary | ICD-10-CM

## 2024-01-18 ENCOUNTER — TELEPHONE (OUTPATIENT)
Dept: SURGERY | Facility: CLINIC | Age: 64
End: 2024-01-18

## 2024-01-25 DIAGNOSIS — S36.13XD INJURY OF BILE DUCT, SUBSEQUENT ENCOUNTER: Primary | ICD-10-CM

## 2024-01-29 PROBLEM — E11.65 UNCONTROLLED TYPE 2 DIABETES MELLITUS WITH HYPERGLYCEMIA (HCC): Status: ACTIVE | Noted: 2023-11-09

## 2024-04-10 ENCOUNTER — TELEPHONE (OUTPATIENT)
Dept: ORTHOPEDICS CLINIC | Facility: CLINIC | Age: 64
End: 2024-04-10

## 2024-04-10 NOTE — TELEPHONE ENCOUNTER
Received 5 pgs forms from [place] [employer] [employer] regarding patient's request for med records from 02/06/2022 to present, faxed and sent original to [employer] for completion.

## (undated) DEVICE — 3M™ RED DOT™ MONITORING ELECTRODE WITH FOAM TAPE AND STICKY GEL, 50/BAG, 20/CASE, 72/PLT 2570: Brand: RED DOT™

## (undated) DEVICE — STERIS KITS

## (undated) DEVICE — BIOGUARD CLEANING ADAPTER

## (undated) DEVICE — C-ARM: Brand: UNBRANDED

## (undated) DEVICE — 1200CC GUARDIAN II: Brand: GUARDIAN

## (undated) DEVICE — KIT,ANTI FOG,W/SPONGE & FLUID,SOFT PACK: Brand: MEDLINE

## (undated) DEVICE — SHEET,DRAPE,40X58,STERILE: Brand: MEDLINE

## (undated) DEVICE — Device

## (undated) DEVICE — SUTURE PDS II 1 CT-1

## (undated) DEVICE — MINI ENDOCUT SCISSOR TIP, DISPOSABLE: Brand: RENEW

## (undated) DEVICE — ADHESIVE SKIN TOP FOR WND CLSR DERMBND ADV

## (undated) DEVICE — CLIP LIG M BLU TI HRT SHP WIRE HORZ

## (undated) DEVICE — MEDI-VAC NON-CONDUCTIVE SUCTION TUBING: Brand: CARDINAL HEALTH

## (undated) DEVICE — POUCH SPECIMEN WIRE 6X3 250ML

## (undated) DEVICE — STERILE POLYISOPRENE POWDER-FREE SURGICAL GLOVES: Brand: PROTEXIS

## (undated) DEVICE — PENCIL TELESCOPE MEGADYNE SE

## (undated) DEVICE — FLUIDGARD® 160 ANTI-FOG SURGICAL MASK WITH ANTI-GLARE SHIELD: Brand: PRECEPT ®

## (undated) DEVICE — FILTERLINE NASAL ADULT O2/CO2

## (undated) DEVICE — PDS II VLT 0 107CM AG ST3: Brand: ENDOLOOP

## (undated) DEVICE — L-HOOK CAUTERY PROBE TIP, DISPOSABLE: Brand: RENEW

## (undated) DEVICE — 3M™ TEGADERM™ +PAD FILM DRESSING WITH NON-ADHERENT PAD, 3587, 3-1/2 IN X 4-1/8 IN (9 CM X 10.5 CM), 25/CAR, 4 CAR/CS: Brand: 3M™ TEGADERM™

## (undated) DEVICE — SUTURE PERMAHAND SZ 3-0 L30IN NONABSORBABLE .

## (undated) DEVICE — DISPOSABLE LAPAROSCOPIC CLIP APPLIER WITH 20 CLIPS.: Brand: EPIX® UNIVERSAL CLIP APPLIER

## (undated) DEVICE — ENDOSCOPY PACK - LOWER: Brand: MEDLINE INDUSTRIES, INC.

## (undated) DEVICE — CLIP INT L ORNG TI TRNSVRS GRV CHEVRON SHP

## (undated) DEVICE — SPONGE GZ W4XL4IN RAYON POLY 6 PLY NWVN

## (undated) DEVICE — EVACUATOR SUR 100CC SIL BLB WND

## (undated) DEVICE — SUTURE VCRL SZ 2-0 L27IN ABSRB UD L26MM SH

## (undated) DEVICE — YANKAUER,POOLE TIP,STERILE,50/CS: Brand: MEDLINE

## (undated) DEVICE — KIT ENDO ORCAPOD 160/180/190

## (undated) DEVICE — TROCAR: Brand: KII FIOS FIRST ENTRY

## (undated) DEVICE — ZIPWIRE GUIDEWIRE .035X150 STR

## (undated) DEVICE — DRESSING ANTIMIC 3.5 X 12 IN AQCEL AG ADVNTG

## (undated) DEVICE — ENDOPATH 5MM ENDOSCOPIC BLUNT TIP DISSECTORS (12 POUCHES CONTAINING 3 DISSECTORS EACH): Brand: ENDOPATH

## (undated) DEVICE — ENDOPATH ULTRA VERESS INSUFFLATION NEEDLES WITH LUER LOCK CONNECTORS: Brand: ENDOPATH

## (undated) DEVICE — 40580 - THE PINK PAD - ADVANCED TRENDELENBURG POSITIONING KIT: Brand: 40580 - THE PINK PAD - ADVANCED TRENDELENBURG POSITIONING KIT

## (undated) DEVICE — FORCEP RADIAL JAW 4

## (undated) DEVICE — LAPCLINCH GRASPER TIP, DISPOSABLE: Brand: RENEW

## (undated) DEVICE — SYRINGE BULB 50/CS 48/PLT: Brand: MEDEGEN MEDICAL PRODUCTS, LLC

## (undated) DEVICE — DRAIN CHANNEL 19FR BLAKE

## (undated) DEVICE — LIGHT HANDLE

## (undated) DEVICE — Device: Brand: DEFENDO AIR/WATER/SUCTION AND BIOPSY VALVE

## (undated) DEVICE — SUTURE PDS II SZ 0 L27IN ABSRB VLT L26MM CT-2

## (undated) DEVICE — INTENDED USED TO PROTECT, TAG AND HELP LOCATED SUTURES DURING SURGERY: Brand: STERION®SUTURE AID BOOTIES

## (undated) DEVICE — TRADITIONAL MARYLAND DISSECTOR TIP, DISPOSABLE: Brand: RENEW

## (undated) DEVICE — ENSEAL 20 CM SHAFT, LARGE JAW: Brand: ENSEAL X1

## (undated) DEVICE — SUTURE VCRL SZ 0 L27IN ABSRB VLT L26MM UR-6

## (undated) DEVICE — LAPAROVUE VISIBILITY SYSTEM LAPAROSCOPIC SOLUTIONS: Brand: LAPAROVUE

## (undated) DEVICE — SAFETY PEG KIT 20FR

## (undated) DEVICE — BLOCK BITE MAXI 60FR

## (undated) DEVICE — LAPAROTOMY SPONGE - RF AND X-RAY DETECTABLE PRE-WASHED: Brand: SITUATE

## (undated) DEVICE — VIOLET BRAIDED (POLYGLACTIN 910), SYNTHETIC ABSORBABLE SUTURE: Brand: COATED VICRYL

## (undated) DEVICE — MASK OXYGEN ADULT W/ C02 10FT

## (undated) DEVICE — SUTURE MCRYL SZ 4-0 L18IN ABSRB UD L19MM PS-2

## (undated) DEVICE — GLOVE SURG SENSICARE SZ 7-1/2

## (undated) DEVICE — 10FT COMBINED O2 DELIVERY/CO2 MONITORING. FILTER WITH MICROSTREAM TYPE LUER: Brand: DUAL ADULT NASAL CANNULA

## (undated) DEVICE — TROCAR: Brand: KII® SLEEVE

## (undated) DEVICE — MEDI-VAC SUCTION HANDLE REGULAR CAPACITY: Brand: CARDINAL HEALTH

## (undated) DEVICE — SLEEVE COMPR M KNEE LEN SGL USE KENDALL SCD

## (undated) DEVICE — TIGERTAIL 5F FLXTIP 70CM

## (undated) DEVICE — INTENDED TO BE USED TO OCCLUDE, RETRACT AND IDENTIFY ARTERIES, VEINS, TENDONS AND NERVES IN SURGICAL PROCEDURES: Brand: STERION®  VESSEL LOOP

## (undated) DEVICE — LAP CHOLE/APPY CDS-LF: Brand: MEDLINE INDUSTRIES, INC.

## (undated) DEVICE — BITE BLOCK ADULT 60F ELASTIC

## (undated) DEVICE — SOLUTION IRRIG 1000ML 0.9% NACL USP BTL

## (undated) NOTE — LETTER
OUTSIDE TESTING RESULT REQUEST     IMPORTANT: FOR YOUR IMMEDIATE ATTENTION  Please FAX all test results listed below to: [phone]         * * * * If testing is NOT complete, arrange with patient A.S.A.P. * * * *      Patient Name: [facility]  Surgery Date: 12/15/2023  Medical Record: [id number]  CSN: [id number]  : 1960 - A: 70 y     Sex: male  Surgeon(s):  Evy Shipley MD  Procedure: LAPAROSCOPIC CHOLECYSTECTOMY WITH INTRAOPERATIVE CHOLANGIOGRAM POSSIBLE OPEN  Anesthesia Type: General     Surgeon: Evy Shipley MD     The following Testing and Time Line are REQUIRED PER ANESTHESIA     EKG READ AND SIGNED WITHIN   90 days    MEDICAL CLEARANCE IS NEEDED.       Thank Mirna Leung RN

## (undated) NOTE — LETTER
ADDRESSEE INFORMATION: PATIENT INFORMATION   To:   Dr. Marito Lei Date: 2023   Fax Number: [phone] Patient Name -Age/Sex Jamin Vizcarra   1960 - A: 70 y    male   Phone Number: [phone] [facility]  Medical Record [id number]  [id number]      DNR NOTIFICATION    Your patient listed above has a procedure scheduled at [facility] and has indicated that he/she currently has a DNR (Do Not Resuscitate) with their Advanced Directives. Please note that you will be asked to address this matter with your patient pre-operatively. Thank you.       Procedure Date 12/15/2023  Procedure LAPAROSCOPIC CHOLECYSTECTOMY WITH INTRAOPERATIVE CHOLANGIOGRAM POSSIBLE OPEN, N/A      Rev Date: 16

## (undated) NOTE — LETTER
[facility]  [address]  Consent for Procedure/Sedation  Date: 9/21/23         Time: 0675    I hereby authorize Dr. Chyna Rodriguez, my physician and his/her assistants (if applicable), which may include medical students, residents, and/or fellows, to perform the following surgical operation/ procedure and administer such anesthesia as may be determined necessary by my physician: Cholecystostomy tube insertion on [facility]  2. I recognize that during the surgical operation/procedure, unforeseen conditions may necessitate additional or different procedures than those listed above. I, therefore, further authorize and request that the above-named surgeon, assistants, or designees perform such procedures as are, in their judgment, necessary and desirable. 3.   My surgeon/physician has discussed prior to my surgery the potential benefits, risks and side effects of this procedure; the likelihood of achieving goals; and potential problems that might occur during recuperation. They also discussed reasonable alternatives to the procedure, including risks, benefits, and side effects related to the alternatives and risks related to not receiving this procedure. I have had all my questions answered and I acknowledge that no guarantee has been made as to the result that may be obtained. 4.   Should the need arise during my operation/procedure, which includes change of level of care prior to discharge, I also consent to the administration of blood and/or blood products. Further, I understand that despite careful testing and screening of blood or blood products by collecting agencies, I may still be subject to ill effects as a result of receiving a blood transfusion and/or blood products.   The following are some, but not all, of the potential risks that can occur: fever and allergic reactions, hemolytic reactions, transmission of diseases such as Hepatitis, AIDS and Cytomegalovirus (CMV) and fluid overload. In the event that I wish to have an autologous transfusion of my own blood, or a directed donor transfusion, I will discuss this with my physician. Check only if Refusing Blood or Blood Products  I understand refusal of blood or blood products as deemed necessary by my physician may have serious consequences to my condition to include possible death. I hereby assume responsibility for my refusal and release the hospital, its personnel, and my physicians from any responsibility for the consequences of my refusal.         o  Refuse         5. I authorize the use of any specimen, organs, tissues, body parts or foreign objects that may be removed from my body during the operation/procedure for diagnosis, research or teaching purposes and their subsequent disposal by hospital authorities. I also authorize the release of specimen test results and/or written reports to my treating physician on the hospital medical staff or other referring or consulting physicians involved in my care, at the discretion of the Pathologist or my treating physician. 6.   I consent to the photographing or videotaping of the operations or procedures to be performed, including appropriate portions of my body for medical, scientific, or educational purposes, provided my identity is not revealed by the pictures or by descriptive texts accompanying them. If the procedure has been photographed/videotaped, the surgeon will obtain the original picture, image, videotape or CD. The hospital will not be responsible for storage, release or maintenance of the picture, image, tape or CD.    7.   I consent to the presence of a  or observers in the operating room as deemed necessary by my physician or their designees. 8.   I recognize that in the event my procedure results in extended X-Ray/fluoroscopy time, I may develop a skin reaction. 9.  If I have a Do Not Attempt Resuscitation (DNAR) order in place, that status will be suspended while in the operating room, procedural suite, and during the recovery period unless otherwise explicitly stated by me (or a person authorized to consent on my behalf). The surgeon or my attending physician will determine when the applicable recovery period ends for purposes of reinstating the DNAR order. 10. Patients having a sterilization procedure: I understand that if the procedure is successful the results will be permanent and it will therefore be impossible for me to inseminate, conceive, or bear children. I also understand that the procedure is intended to result in sterility, although the result has not been guaranteed. 11. I acknowledge that my physician has explained sedation/analgesia administration to me including the risk and benefits I consent to the administration of sedation/analgesia as may be necessary or desirable in the judgment of my physician.     I CERTIFY THAT I HAVE READ AND FULLY UNDERSTAND THE ABOVE CONSENT TO OPERATION and/or OTHER PROCEDURE.        ____________________________________       _________________________________      ______________________________  Signature of Patient         Signature of Responsible Person        Printed Name of Responsible Person        ____________________________________      _________________________________      ______________________________       Signature of Witness          Relationship to Patient                       Date                                       Time  Patient Name: Peggy Chavez     : 1960                 Printed: 2023      Medical Record #: [id number]                      Page 1 of 1

## (undated) NOTE — LETTER
Patient Name: Jenna Dwyer / Sex: 11/16/1960-A: 70 y  male   Medical Records: [id number] CSN: [id number]    ABNORMAL VALUES  Surgeon(s):  Farnaz Hawkins MD  Anesthesia Type: General  Date of Surgery: 12/15/2023  Procedure Description: LAPAROSCOPIC CHOLECYSTECTOMY WITH INTRAOPERATIVE CHOLANGIOGRAM POSSIBLE OPEN  Primary Surgeon:  Farnaz Hawkins MD  Phone Number: [phone]    PLEASE NOTE THE FOLLOWING ABNORMALITIES:   Chemistry  9/23/23 AST -310, ALT- 325

## (undated) NOTE — LETTER
Patient Name: MARTHA BURKS [facility]  Surgery Date: 8/14/2023  Medical Record: [id number] CSN: [id number]      Surgeon(s):  Sameer Marion, DO  Consent Procedure: ESOPHAGOGASTRODUODENOSCOPY (EGD)  Anesthesia Type: MAC          Medication Instructions: To ensure a safe procedure/surgery there are certain medications that may need to be stopped prior to your procedure. Please call your surgeon and prescribing physician for instructions on stopping the following blood thinning medications prior to your procedure/surgery. Your physician will provide you with instructions on whether or not you will need to stop and when to stop these medications. Coumadin  Xarelto  Pradaxa, Ticlid  Aspirin  Ibuprofen, Motrin, Aleve  Meloxicam, Naproxen, Etodolac, Diclofenac  * Any blood thinning medication     If you are taking any herbal supplements you need to stop them at least 14 days before your procedure or at the time of our call whichever comes first.     If you are taking any weight loss products (anorexiants), such as Phentermine, you will need to stop taking them at least 10 days prior to your procedure/surgery or at the time of the screening phone call whichever comes first.        Diabetes Patient Instructions     Diabetes Medications  To keep your blood glucose in control before, during and after your procedure/surgery, call the doctor who manages your insulin for instructions for taking your insulin the day before surgery and the morning of surgery   If you are taking oral medications for your diabetes, you will need to hold the morning dose the day of your surgery/procedure  If you are taking a SGLT2 Inhibitor medication such as  Canagliflozin(Invokana), Dapagliflozin (Bri Howard), Empagliflozin (Jardiance), or Ertugliflozin ([address] [place] [address]) you will need to hold these medications for 4 days prior to the day of your surgery/procedure or as instructed during the screening phone call.   Carefully monitor your blood sugar while you are holding these medications and contact your prescribing physician with any abnormal blood sugar levels. If you are taking oral medications for your diabetes, you will need to hold the morning dose the day of your surgery/procedure  Blood Glucose Monitoring      To maintain safe blood glucose levels, check your blood glucose:   The evening before bed  When awakening in the morning  Then every 4 hours until admission to the hospital     If blood glucose is 70 or below   Take glucose tablets (15-16 grams of carbohydrate) or drink 4 ounces (1/2 cup) of a clear sugar-containing beverage such as apple juice or lemon-lime soda (not diet)  Recheck blood glucose in 15 minutes  If blood glucose is not greater than 70 mg/dL in 15 minutes, repeat treatment     You must inform surgical staff if you experienced any hypoglycemia episodes when you arrive at the hospital

## (undated) NOTE — LETTER
URGENT: SURGERY IS 12/15/2023    OUTSIDE TESTING RESULT REQUEST     IMPORTANT: FOR YOUR IMMEDIATE ATTENTION  Please FAX all test results listed below to: [phone]       * * * * If testing is NOT complete, arrange with patient A.S.A.P. * * * *      Patient Name: [facility]  Surgery Date: 12/15/2023  Medical Record: [id number]  CSN: [id number]  : 1960 - A: 70 y     Sex: male  Surgeon(s):  Christi Jones MD  Procedure: LAPAROSCOPIC CHOLECYSTECTOMY WITH INTRAOPERATIVE CHOLANGIOGRAM POSSIBLE OPEN  Anesthesia Type: General     Surgeon: Christi Jones MD     The following Testing and Time Line are REQUIRED PER ANESTHESIA     EKG READ AND SIGNED WITHIN   90 days      PLEASE Tc Riley       Thank You,   Sent by: Efrain Alas RN

## (undated) NOTE — LETTER
To:  Cory Kingston RN  Date:  11/17/2023  Patient Name: Mary Branch / Sex: 11/16/1960-A: 70 y  male  Medical Records: [id number]   CSN: [id number]      Clearance for Surgery Requested by Surgeon    Request for:  Medical Clearance    Requested by Surgeon: Dr. Helen Alcantara    Surgical Date: 12/15/2023    Procedure: LAPAROSCOPIC CHOLECYSTECTOMY WITH INTRAOPERATIVE CHOLANGIOGRAM POSSIBLE OPEN, N/A      Please also fax face sheet, med list, DNR, POA forms    Please fax the clearance note to the [facility]  department. Thank you.     Iesha Henaoet: [phone]  F: [phone]